# Patient Record
Sex: FEMALE | Race: WHITE | NOT HISPANIC OR LATINO | Employment: FULL TIME | URBAN - METROPOLITAN AREA
[De-identification: names, ages, dates, MRNs, and addresses within clinical notes are randomized per-mention and may not be internally consistent; named-entity substitution may affect disease eponyms.]

---

## 2023-05-05 ENCOUNTER — TRANSCRIBE ORDERS (OUTPATIENT)
Dept: LAB | Facility: HOSPITAL | Age: 28
End: 2023-05-05
Payer: COMMERCIAL

## 2023-05-05 ENCOUNTER — LAB (OUTPATIENT)
Dept: LAB | Facility: HOSPITAL | Age: 28
End: 2023-05-05
Payer: COMMERCIAL

## 2023-05-05 DIAGNOSIS — Z3A.11 11 WEEKS GESTATION OF PREGNANCY: ICD-10-CM

## 2023-05-05 DIAGNOSIS — Z34.81 PRENATAL CARE, SUBSEQUENT PREGNANCY, FIRST TRIMESTER: ICD-10-CM

## 2023-05-05 DIAGNOSIS — Z3A.11 11 WEEKS GESTATION OF PREGNANCY: Primary | ICD-10-CM

## 2023-05-05 LAB
ABO GROUP BLD: NORMAL
AMORPH URATE CRY URNS QL MICRO: ABNORMAL /HPF
AMPHET+METHAMPHET UR QL: NEGATIVE
AMPHETAMINES UR QL: NEGATIVE
BACTERIA UR QL AUTO: ABNORMAL /HPF
BARBITURATES UR QL SCN: NEGATIVE
BENZODIAZ UR QL SCN: NEGATIVE
BILIRUB UR QL STRIP: NEGATIVE
BUPRENORPHINE SERPL-MCNC: NEGATIVE NG/ML
CANNABINOIDS SERPL QL: NEGATIVE
CLARITY UR: ABNORMAL
COCAINE UR QL: NEGATIVE
COD CRY URNS QL: ABNORMAL /HPF
COLOR UR: YELLOW
DEPRECATED RDW RBC AUTO: 41.2 FL (ref 37–54)
ERYTHROCYTE [DISTWIDTH] IN BLOOD BY AUTOMATED COUNT: 12 % (ref 12.3–15.4)
GLUCOSE UR STRIP-MCNC: NEGATIVE MG/DL
HBV SURFACE AG SERPL QL IA: NORMAL
HCT VFR BLD AUTO: 36.8 % (ref 34–46.6)
HCV AB SER DONR QL: NORMAL
HGB BLD-MCNC: 12.8 G/DL (ref 12–15.9)
HGB UR QL STRIP.AUTO: NEGATIVE
HIV1+2 AB SER QL: NORMAL
HYALINE CASTS UR QL AUTO: ABNORMAL /LPF
KETONES UR QL STRIP: NEGATIVE
LEUKOCYTE ESTERASE UR QL STRIP.AUTO: NEGATIVE
MCH RBC QN AUTO: 32.7 PG (ref 26.6–33)
MCHC RBC AUTO-ENTMCNC: 34.8 G/DL (ref 31.5–35.7)
MCV RBC AUTO: 93.9 FL (ref 79–97)
METHADONE UR QL SCN: NEGATIVE
NITRITE UR QL STRIP: NEGATIVE
OPIATES UR QL: NEGATIVE
OXYCODONE UR QL SCN: NEGATIVE
PCP UR QL SCN: NEGATIVE
PH UR STRIP.AUTO: 6.5 [PH] (ref 5–8)
PLATELET # BLD AUTO: 192 10*3/MM3 (ref 140–450)
PMV BLD AUTO: 11.3 FL (ref 6–12)
PROPOXYPH UR QL: NEGATIVE
PROT UR QL STRIP: NEGATIVE
RBC # BLD AUTO: 3.92 10*6/MM3 (ref 3.77–5.28)
RBC # UR STRIP: ABNORMAL /HPF
REF LAB TEST METHOD: ABNORMAL
RH BLD: POSITIVE
SP GR UR STRIP: 1.02 (ref 1–1.03)
SQUAMOUS #/AREA URNS HPF: ABNORMAL /HPF
TRICYCLICS UR QL SCN: NEGATIVE
UROBILINOGEN UR QL STRIP: ABNORMAL
WBC # UR STRIP: ABNORMAL /HPF
WBC NRBC COR # BLD: 5.64 10*3/MM3 (ref 3.4–10.8)

## 2023-05-05 PROCEDURE — 36415 COLL VENOUS BLD VENIPUNCTURE: CPT

## 2023-05-05 PROCEDURE — 87591 N.GONORRHOEAE DNA AMP PROB: CPT | Performed by: OBSTETRICS & GYNECOLOGY

## 2023-05-05 PROCEDURE — 87086 URINE CULTURE/COLONY COUNT: CPT

## 2023-05-05 PROCEDURE — 86803 HEPATITIS C AB TEST: CPT

## 2023-05-05 PROCEDURE — 86762 RUBELLA ANTIBODY: CPT

## 2023-05-05 PROCEDURE — 80306 DRUG TEST PRSMV INSTRMNT: CPT

## 2023-05-05 PROCEDURE — 87491 CHLMYD TRACH DNA AMP PROBE: CPT | Performed by: OBSTETRICS & GYNECOLOGY

## 2023-05-05 PROCEDURE — 87340 HEPATITIS B SURFACE AG IA: CPT

## 2023-05-05 PROCEDURE — 86787 VARICELLA-ZOSTER ANTIBODY: CPT

## 2023-05-05 PROCEDURE — 86900 BLOOD TYPING SEROLOGIC ABO: CPT

## 2023-05-05 PROCEDURE — 85027 COMPLETE CBC AUTOMATED: CPT

## 2023-05-05 PROCEDURE — G0432 EIA HIV-1/HIV-2 SCREEN: HCPCS

## 2023-05-05 PROCEDURE — 86901 BLOOD TYPING SEROLOGIC RH(D): CPT

## 2023-05-05 PROCEDURE — 81001 URINALYSIS AUTO W/SCOPE: CPT

## 2023-05-05 PROCEDURE — 86780 TREPONEMA PALLIDUM: CPT

## 2023-05-06 LAB
BACTERIA SPEC AEROBE CULT: NORMAL
RUBV IGG SERPL IA-ACNC: 3.19 INDEX
VZV IGG SER IA-ACNC: <135 INDEX

## 2023-05-08 LAB
C TRACH RRNA SPEC QL NAA+PROBE: NEGATIVE
N GONORRHOEA RRNA SPEC QL NAA+PROBE: NEGATIVE
TREPONEMA PALLIDUM IGG+IGM AB [PRESENCE] IN SERUM OR PLASMA BY IMMUNOASSAY: NON REACTIVE

## 2023-09-08 ENCOUNTER — LAB (OUTPATIENT)
Dept: LAB | Facility: HOSPITAL | Age: 28
End: 2023-09-08
Payer: COMMERCIAL

## 2023-09-08 ENCOUNTER — TRANSCRIBE ORDERS (OUTPATIENT)
Dept: LAB | Facility: HOSPITAL | Age: 28
End: 2023-09-08
Payer: COMMERCIAL

## 2023-09-08 ENCOUNTER — TRANSCRIBE ORDERS (OUTPATIENT)
Dept: OBSTETRICS AND GYNECOLOGY | Facility: HOSPITAL | Age: 28
End: 2023-09-08
Payer: COMMERCIAL

## 2023-09-08 DIAGNOSIS — Z34.82 PRENATAL CARE, SUBSEQUENT PREGNANCY, SECOND TRIMESTER: ICD-10-CM

## 2023-09-08 DIAGNOSIS — O36.5931 IUGR (INTRAUTERINE GROWTH RESTRICTION) AFFECTING CARE OF MOTHER, THIRD TRIMESTER, FETUS 1: Primary | ICD-10-CM

## 2023-09-08 DIAGNOSIS — Z34.82 PRENATAL CARE, SUBSEQUENT PREGNANCY, SECOND TRIMESTER: Primary | ICD-10-CM

## 2023-09-08 DIAGNOSIS — Z3A.24 24 WEEKS GESTATION OF PREGNANCY: ICD-10-CM

## 2023-09-08 LAB
DEPRECATED RDW RBC AUTO: 42.7 FL (ref 37–54)
ERYTHROCYTE [DISTWIDTH] IN BLOOD BY AUTOMATED COUNT: 12 % (ref 12.3–15.4)
GLUCOSE 1H P 100 G GLC PO SERPL-MCNC: 101 MG/DL (ref 65–139)
HCT VFR BLD AUTO: 33.9 % (ref 34–46.6)
HGB BLD-MCNC: 11.6 G/DL (ref 12–15.9)
MCH RBC QN AUTO: 33.1 PG (ref 26.6–33)
MCHC RBC AUTO-ENTMCNC: 34.2 G/DL (ref 31.5–35.7)
MCV RBC AUTO: 96.9 FL (ref 79–97)
PLATELET # BLD AUTO: 151 10*3/MM3 (ref 140–450)
PMV BLD AUTO: 11.2 FL (ref 6–12)
RBC # BLD AUTO: 3.5 10*6/MM3 (ref 3.77–5.28)
WBC NRBC COR # BLD: 8.32 10*3/MM3 (ref 3.4–10.8)

## 2023-09-08 PROCEDURE — 36415 COLL VENOUS BLD VENIPUNCTURE: CPT

## 2023-09-08 PROCEDURE — 82962 GLUCOSE BLOOD TEST: CPT | Performed by: OBSTETRICS & GYNECOLOGY

## 2023-09-08 PROCEDURE — 85027 COMPLETE CBC AUTOMATED: CPT

## 2023-09-08 PROCEDURE — 82950 GLUCOSE TEST: CPT

## 2023-09-25 ENCOUNTER — OFFICE VISIT (OUTPATIENT)
Dept: OBSTETRICS AND GYNECOLOGY | Facility: HOSPITAL | Age: 28
End: 2023-09-25

## 2023-09-25 ENCOUNTER — HOSPITAL ENCOUNTER (OUTPATIENT)
Dept: WOMENS IMAGING | Facility: HOSPITAL | Age: 28
Discharge: HOME | End: 2023-09-25
Admitting: OBSTETRICS & GYNECOLOGY
Payer: COMMERCIAL

## 2023-09-25 VITALS — WEIGHT: 156.4 LBS | BODY MASS INDEX: 27.71 KG/M2 | DIASTOLIC BLOOD PRESSURE: 71 MMHG | SYSTOLIC BLOOD PRESSURE: 123 MMHG

## 2023-09-25 DIAGNOSIS — O36.5930 IUGR (INTRAUTERINE GROWTH RETARDATION) AFFECTING MOTHER, THIRD TRIMESTER, NOT APPLICABLE OR UNSPECIFIED FETUS: Primary | ICD-10-CM

## 2023-09-25 DIAGNOSIS — O36.5931 IUGR (INTRAUTERINE GROWTH RESTRICTION) AFFECTING CARE OF MOTHER, THIRD TRIMESTER, FETUS 1: ICD-10-CM

## 2023-09-25 PROCEDURE — 76811 OB US DETAILED SNGL FETUS: CPT

## 2023-09-25 PROCEDURE — 76819 FETAL BIOPHYS PROFIL W/O NST: CPT

## 2023-09-25 PROCEDURE — 76820 UMBILICAL ARTERY ECHO: CPT

## 2023-09-25 RX ORDER — PNV NO.95/FERROUS FUM/FOLIC AC 28MG-0.8MG
1 TABLET ORAL DAILY
COMMUNITY

## 2023-09-25 RX ORDER — ASPIRIN 81 MG/1
81 TABLET, CHEWABLE ORAL DAILY
COMMUNITY

## 2023-09-25 NOTE — LETTER
2023       No Recipients    Patient: Ninfa Garcia   YOB: 1995   Date of Visit: 2023       Dear Allison Canavan, MD,    Thank you for referring Ninfa Garcia to me for evaluation. Below is a copy of my consult note.    If you have questions, please do not hesitate to call me. I look forward to following Ninfa along with you.         Sincerely,        Ayan Sosa MD        CC:   No Recipients    No complaints today, Next f/u with [facility] on 23, NIPT negative  Pt reports covid + at 19 weeks now on baby asa daily         Maternal/Fetal Medicine Consult Note   Date: 2023  Name: Ninfa Garcia    : 1995     MRN: [id number]     Referring Provider: Allison Canavan, MD    Chief Complaint  IUGR    Subjective     History of Present Illness:  Ninfa Garcia is a 28 y.o.  31w1d who presents today for concern for fetal growth restriction and COVID diagnosis during pregnancy.    Patient states she feels well today.  Denies contractions, leaking of fluid, vaginal bleeding.  Having normal fetal movement    Patient was diagnosed with COVID around 19 weeks gestation and was prescribed aspirin after this.    SILVERIO: Estimated Date of Delivery: 23     ROS:   Otherwise Noted in HPI    History reviewed. No pertinent past medical history.   Past Surgical History:   Procedure Laterality Date   • CHOLECYSTECTOMY     • DENTAL PROCEDURE     • TONSILLECTOMY        OB History          1    Para   0    Term   0       0    AB   0    Living   0         SAB   0    IAB   0    Ectopic   0    Molar   0    Multiple   0    Live Births   0          Obstetric Comments   Fob #1 - Pregnancy #1                Current Outpatient Medications:   •  aspirin 81 MG chewable tablet, Chew 1 tablet Daily., Disp: , Rfl:   •  Prenatal Vit-Fe Fumarate-FA (Prenatal Vitamin) 27-0.8 MG tablet, Take 1 tablet by mouth Daily., Disp: , Rfl:     Objective     Vital Signs  BP  "123/71   Wt 70.9 kg (156 lb 6.4 oz)   LMP 2023   Estimated body mass index is 27.71 kg/m² as calculated from the following:    Height as of 21: 160 cm (63\").    Weight as of this encounter: 70.9 kg (156 lb 6.4 oz).    Ultrasound Impression:   See Viewpoint     Assessment and Plan     Ninfa Garcia is a 28 y.o.  31w1d who presents today for concern for fetal growth restriction and COVID diagnosis during pregnancy.    Diagnoses and all orders for this visit:    1. IUGR (intrauterine growth retardation) affecting mother, third trimester, not applicable or unspecified fetus (Primary)  Assessment & Plan:  Overall fetal weight today at the 21st percentile though abdominal circumference at the 3rd percentile giving diagnosis of IUGR. Reassuringly the amniotic fluid and umbilical artery cord Doppler are both normal today. BPP is 8/8 today.     There are various etiologies for growth restriction including: Infection, genetic factors such as aneuploidy, placental abnormalities, constitutional, and maternal vascular disease. No sonographic markers for infection were seen today. No congenital anomalies were seen today however anatomic survey was limited by advanced gestational age and fetal position. We discussed how management of growth restriction is essentially the same regardless of etiology with close fetal monitoring. Recommend weekly BPP/UA dopplers alternating between your office and at [facility] and starting NSTs. Will repeat growth ultrasound in 2 weeks. We discussed delivery between 37-38 weeks if  testing remains reassuring.     Orders:  -     [facility]; Standing         Follow Up  Follow-up growth ultrasound in 2 weeks    I spent 30 minutes caring for the patient on the day of service. This included: obtaining or reviewing a separately obtained medical history, reviewing patient records, performing a medically appropriate exam and/or evaluation, counseling or " educating the patient/family/caregiver, ordering medications, labs, and/or procedures and documenting such in the medical record. This does not include time spent on review and interpretation of other tests such as fetal ultrasound or the performance of other procedures such as amniocentesis or CVS.      Ayan Sosa MD, FACOG  Maternal Fetal Medicine, [facility]

## 2023-09-25 NOTE — PROGRESS NOTES
No complaints today, Next f/u with [facility] on 9/27/23, NIPT negative  Pt reports covid + at 19 weeks now on baby asa daily

## 2023-09-25 NOTE — ASSESSMENT & PLAN NOTE
Overall fetal weight today at the 21st percentile though abdominal circumference at the 3rd percentile giving diagnosis of IUGR. Reassuringly the amniotic fluid and umbilical artery cord Doppler are both normal today. BPP is 8/8 today.     There are various etiologies for growth restriction including: Infection, genetic factors such as aneuploidy, placental abnormalities, constitutional, and maternal vascular disease. No sonographic markers for infection were seen today. No congenital anomalies were seen today however anatomic survey was limited by advanced gestational age and fetal position. We discussed how management of growth restriction is essentially the same regardless of etiology with close fetal monitoring. Recommend weekly BPP/UA dopplers alternating between your office and at [facility] and starting NSTs. Will repeat growth ultrasound in 2 weeks. We discussed delivery between 37-38 weeks if  testing remains reassuring.

## 2023-09-30 PROCEDURE — 87086 URINE CULTURE/COLONY COUNT: CPT | Performed by: NURSE PRACTITIONER

## 2023-10-09 ENCOUNTER — OFFICE VISIT (OUTPATIENT)
Dept: OBSTETRICS AND GYNECOLOGY | Facility: HOSPITAL | Age: 28
End: 2023-10-09
Payer: COMMERCIAL

## 2023-10-09 ENCOUNTER — HOSPITAL ENCOUNTER (OUTPATIENT)
Dept: WOMENS IMAGING | Facility: HOSPITAL | Age: 28
Discharge: HOME | End: 2023-10-09
Admitting: OBSTETRICS & GYNECOLOGY
Payer: COMMERCIAL

## 2023-10-09 VITALS — WEIGHT: 158 LBS | BODY MASS INDEX: 27.99 KG/M2 | SYSTOLIC BLOOD PRESSURE: 120 MMHG | DIASTOLIC BLOOD PRESSURE: 78 MMHG

## 2023-10-09 DIAGNOSIS — O36.5930 IUGR (INTRAUTERINE GROWTH RETARDATION) AFFECTING MOTHER, THIRD TRIMESTER, NOT APPLICABLE OR UNSPECIFIED FETUS: Primary | ICD-10-CM

## 2023-10-09 PROCEDURE — 76819 FETAL BIOPHYS PROFIL W/O NST: CPT

## 2023-10-09 PROCEDURE — 76816 OB US FOLLOW-UP PER FETUS: CPT

## 2023-10-09 NOTE — PROGRESS NOTES
"Pt reports having low back pain since yesterday, \" worried about baby so wanted to get in early\", Next f/u with [facility] 10/13/23, NIPT neg   "

## 2023-10-16 ENCOUNTER — LAB (OUTPATIENT)
Dept: LAB | Facility: HOSPITAL | Age: 28
End: 2023-10-16
Payer: COMMERCIAL

## 2023-10-16 ENCOUNTER — TRANSCRIBE ORDERS (OUTPATIENT)
Dept: LAB | Facility: HOSPITAL | Age: 28
End: 2023-10-16
Payer: COMMERCIAL

## 2023-10-16 DIAGNOSIS — Z3A.34 34 WEEKS GESTATION OF PREGNANCY: ICD-10-CM

## 2023-10-16 DIAGNOSIS — O36.5990 IUGR, ANTENATAL: Primary | ICD-10-CM

## 2023-10-16 DIAGNOSIS — O36.5990 IUGR, ANTENATAL: ICD-10-CM

## 2023-10-16 DIAGNOSIS — O09.893 SUPERVISION OF OTHER HIGH RISK PREGNANCIES, THIRD TRIMESTER: ICD-10-CM

## 2023-10-16 DIAGNOSIS — L29.9 ITCHING: ICD-10-CM

## 2023-10-16 LAB
ALBUMIN SERPL-MCNC: 3.9 G/DL (ref 3.5–5.2)
ALBUMIN/GLOB SERPL: 1.4 G/DL
ALP SERPL-CCNC: 71 U/L (ref 39–117)
ALT SERPL W P-5'-P-CCNC: 10 U/L (ref 1–33)
ANION GAP SERPL CALCULATED.3IONS-SCNC: 10 MMOL/L (ref 5–15)
AST SERPL-CCNC: 14 U/L (ref 1–32)
BILE AC SERPL-SCNC: 13 UMOL/L (ref 0–10)
BILIRUB SERPL-MCNC: 1.1 MG/DL (ref 0–1.2)
BUN SERPL-MCNC: 6 MG/DL (ref 6–20)
BUN/CREAT SERPL: 10.5 (ref 7–25)
CALCIUM SPEC-SCNC: 9.5 MG/DL (ref 8.6–10.5)
CHLORIDE SERPL-SCNC: 104 MMOL/L (ref 98–107)
CO2 SERPL-SCNC: 24 MMOL/L (ref 22–29)
CREAT SERPL-MCNC: 0.57 MG/DL (ref 0.57–1)
EGFRCR SERPLBLD CKD-EPI 2021: 127.9 ML/MIN/1.73
GLOBULIN UR ELPH-MCNC: 2.8 GM/DL
GLUCOSE SERPL-MCNC: 114 MG/DL (ref 65–99)
POTASSIUM SERPL-SCNC: 4.4 MMOL/L (ref 3.5–5.2)
PROT SERPL-MCNC: 6.7 G/DL (ref 6–8.5)
SODIUM SERPL-SCNC: 138 MMOL/L (ref 136–145)

## 2023-10-16 PROCEDURE — 82239 BILE ACIDS TOTAL: CPT

## 2023-10-16 PROCEDURE — 36415 COLL VENOUS BLD VENIPUNCTURE: CPT

## 2023-10-16 PROCEDURE — 80053 COMPREHEN METABOLIC PANEL: CPT

## 2023-10-26 ENCOUNTER — TRANSCRIBE ORDERS (OUTPATIENT)
Dept: LAB | Facility: HOSPITAL | Age: 28
End: 2023-10-26
Payer: COMMERCIAL

## 2023-10-26 ENCOUNTER — LAB (OUTPATIENT)
Dept: LAB | Facility: HOSPITAL | Age: 28
End: 2023-10-26
Payer: COMMERCIAL

## 2023-10-26 DIAGNOSIS — O36.5990 POOR FETAL GROWTH, AFFECTING MANAGEMENT OF MOTHER, DELIVERED: ICD-10-CM

## 2023-10-26 DIAGNOSIS — K83.1 OBSTRUCTION OF BILE DUCT: ICD-10-CM

## 2023-10-26 DIAGNOSIS — O09.893 HISTORY OF MATERNAL HEMATOMA OF BROAD LIGAMENT, CURRENTLY PREGNANT, THIRD TRIMESTER: ICD-10-CM

## 2023-10-26 DIAGNOSIS — O36.5990 POOR FETAL GROWTH, AFFECTING MANAGEMENT OF MOTHER, DELIVERED: Primary | ICD-10-CM

## 2023-10-26 LAB — BILE AC SERPL-SCNC: 17 UMOL/L (ref 0–10)

## 2023-10-26 PROCEDURE — 82239 BILE ACIDS TOTAL: CPT

## 2023-10-26 PROCEDURE — 36415 COLL VENOUS BLD VENIPUNCTURE: CPT

## 2023-10-30 ENCOUNTER — OFFICE VISIT (OUTPATIENT)
Dept: OBSTETRICS AND GYNECOLOGY | Facility: HOSPITAL | Age: 28
End: 2023-10-30
Payer: COMMERCIAL

## 2023-10-30 ENCOUNTER — HOSPITAL ENCOUNTER (OUTPATIENT)
Dept: WOMENS IMAGING | Facility: HOSPITAL | Age: 28
Discharge: HOME | End: 2023-10-30
Admitting: OBSTETRICS & GYNECOLOGY
Payer: COMMERCIAL

## 2023-10-30 VITALS — DIASTOLIC BLOOD PRESSURE: 77 MMHG | WEIGHT: 160.8 LBS | BODY MASS INDEX: 28.48 KG/M2 | SYSTOLIC BLOOD PRESSURE: 114 MMHG

## 2023-10-30 DIAGNOSIS — K83.1 CHOLESTASIS OF PREGNANCY IN THIRD TRIMESTER: ICD-10-CM

## 2023-10-30 DIAGNOSIS — O26.613 CHOLESTASIS OF PREGNANCY IN THIRD TRIMESTER: ICD-10-CM

## 2023-10-30 DIAGNOSIS — O36.5930 IUGR (INTRAUTERINE GROWTH RETARDATION) AFFECTING MOTHER, THIRD TRIMESTER, NOT APPLICABLE OR UNSPECIFIED FETUS: Primary | ICD-10-CM

## 2023-10-30 PROCEDURE — 76816 OB US FOLLOW-UP PER FETUS: CPT

## 2023-10-30 PROCEDURE — 76819 FETAL BIOPHYS PROFIL W/O NST: CPT

## 2023-10-30 PROCEDURE — 76820 UMBILICAL ARTERY ECHO: CPT

## 2023-10-30 RX ORDER — URSODIOL 300 MG/1
600 CAPSULE ORAL 3 TIMES DAILY
Status: ON HOLD | COMMUNITY

## 2023-10-30 NOTE — LETTER
"2023       No Recipients    Patient: Ninfa Garcia   YOB: 1995   Date of Visit: 10/30/2023       Dear Marely Martinez MD,    Thank you for referring Ninfa Garcia to me for evaluation. Below is a copy of my consult note.    If you have questions, please do not hesitate to call me. I look forward to following Ninfa along with you.         Sincerely,        Smitha Bailey MD        CC:   No Recipients    Pt reports recent diagnosis of Cholestasis, taking 600mg of ursodiol tid, Next f/u with Michelle today, NIPT neg  Pt reports Induction on ,  but possibly moving up due to cholestasis        Maternal/Fetal Medicine Follow Up Note     Name: Ninfa Garcia    : 1995     MRN: [id number]     Referring Provider: Clint     Chief Complaint  Small AC, New diagnosis of cholestasis    Subjective     History of Present Illness:  Ninfa Garcia is a 28 y.o.  36w1d who presents today for follow up in the setting of known IUGR and new diagnosis of intrahepatic cholestasis of pregnancy. Currently taking Ursodiol with well controlled symptoms. Last bile acid 17   Undergoing twice weekly  testing     SILVERIO: Estimated Date of Delivery: 23     ROS:   As noted in HPI.     Objective     Vital Signs  /77   Wt 72.9 kg (160 lb 12.8 oz)   LMP 2023   Estimated body mass index is 28.48 kg/m² as calculated from the following:    Height as of 23: 160 cm (63\").    Weight as of this encounter: 72.9 kg (160 lb 12.8 oz).    Ultrasound Impression:   See viewpoint    Assessment and Plan     Ninfa Garcia is a 28 y.o.  36w1d    Diagnoses and all orders for this visit:    1. IUGR (intrauterine growth retardation) affecting mother, third trimester, not applicable or unspecified fetus (Primary)  Assessment & Plan:  While overall appropriate interval growth, the AC remains small (4th%ile today).   Considering the multiple co-morbidities of " IUGR and cholestasis of pregnancy, agree with plan for delivery at 37 weeks GA   Continued twice weekly  testing in your office until that time is recommended   Further scans with MFM at your discretion       2. Cholestasis of pregnancy in third trimester         Follow Up  No follow-ups on file.    I spent 30 minutes caring for the patient on the day of service. This included: obtaining or reviewing a separately obtained medical history, reviewing patient records, performing a medically appropriate exam and/or evaluation, counseling or educating the patient/family/caregiver, ordering medications, labs, and/or procedures and documenting such in the medical record. This does not include time spent on review and interpretation of other tests such as fetal ultrasound or the performance of other procedures such as amniocentesis or CVS.    Smitha Bailey MD FACOG  Maternal Fetal Medicine, [facility]     10/30/2023

## 2023-10-30 NOTE — ASSESSMENT & PLAN NOTE
While overall appropriate interval growth, the AC remains small (4th%ile today).   Considering the multiple co-morbidities of IUGR and cholestasis of pregnancy, agree with plan for delivery at 37 weeks GA   Continued twice weekly  testing in your office until that time is recommended   Further scans with MFM at your discretion

## 2023-10-30 NOTE — PROGRESS NOTES
"    Maternal/Fetal Medicine Follow Up Note     Name: Ninfa Garcia    : 1995     MRN: [id number]     Referring Provider: Clint     Chief Complaint  Small AC, New diagnosis of cholestasis    Subjective     History of Present Illness:  Ninfa Garcia is a 28 y.o.  36w1d who presents today for follow up in the setting of known IUGR and new diagnosis of intrahepatic cholestasis of pregnancy. Currently taking Ursodiol with well controlled symptoms. Last bile acid 17   Undergoing twice weekly  testing     SILVERIO: Estimated Date of Delivery: 23     ROS:   As noted in HPI.     Objective     Vital Signs  /77   Wt 72.9 kg (160 lb 12.8 oz)   LMP 2023   Estimated body mass index is 28.48 kg/m² as calculated from the following:    Height as of 23: 160 cm (63\").    Weight as of this encounter: 72.9 kg (160 lb 12.8 oz).    Ultrasound Impression:   See viewpoint    Assessment and Plan     Ninfa Garcia is a 28 y.o.  36w1d    Diagnoses and all orders for this visit:    1. IUGR (intrauterine growth retardation) affecting mother, third trimester, not applicable or unspecified fetus (Primary)  Assessment & Plan:  While overall appropriate interval growth, the AC remains small (4th%ile today).   Considering the multiple co-morbidities of IUGR and cholestasis of pregnancy, agree with plan for delivery at 37 weeks GA   Continued twice weekly  testing in your office until that time is recommended   Further scans with MFM at your discretion       2. Cholestasis of pregnancy in third trimester         Follow Up  No follow-ups on file.    I spent 30 minutes caring for the patient on the day of service. This included: obtaining or reviewing a separately obtained medical history, reviewing patient records, performing a medically appropriate exam and/or evaluation, counseling or educating the patient/family/caregiver, ordering medications, labs, and/or procedures " and documenting such in the medical record. This does not include time spent on review and interpretation of other tests such as fetal ultrasound or the performance of other procedures such as amniocentesis or CVS.    Smitha Bailey MD FACOG  Maternal Fetal Medicine, [facility]     10/30/2023

## 2023-10-30 NOTE — PROGRESS NOTES
Pt reports recent diagnosis of Cholestasis, taking 600mg of ursodiol tid, Next f/u with [facility] today, NIPT neg  Pt reports Induction on 11/12,  but possibly moving up due to cholestasis

## 2023-11-05 ENCOUNTER — HOSPITAL ENCOUNTER (INPATIENT)
Facility: HOSPITAL | Age: 28
LOS: 4 days | Discharge: HOME | End: 2023-11-09
Attending: OBSTETRICS & GYNECOLOGY | Admitting: OBSTETRICS & GYNECOLOGY
Payer: COMMERCIAL

## 2023-11-05 ENCOUNTER — HOSPITAL ENCOUNTER (OUTPATIENT)
Dept: LABOR AND DELIVERY | Facility: HOSPITAL | Age: 28
Discharge: HOME | End: 2023-11-05
Payer: COMMERCIAL

## 2023-11-05 DIAGNOSIS — Z98.891 STATUS POST PRIMARY LOW TRANSVERSE CESAREAN SECTION: Primary | ICD-10-CM

## 2023-11-05 PROBLEM — O36.5990 IUGR (INTRAUTERINE GROWTH RESTRICTION) AFFECTING CARE OF MOTHER: Status: ACTIVE | Noted: 2023-11-05

## 2023-11-05 LAB
ABO GROUP BLD: NORMAL
ALP SERPL-CCNC: 79 U/L (ref 39–117)
ALT SERPL W P-5'-P-CCNC: 8 U/L (ref 1–33)
AST SERPL-CCNC: 12 U/L (ref 1–32)
BILIRUB SERPL-MCNC: 1.1 MG/DL (ref 0–1.2)
BLD GP AB SCN SERPL QL: NEGATIVE
CREAT SERPL-MCNC: 0.47 MG/DL (ref 0.57–1)
DEPRECATED RDW RBC AUTO: 46.5 FL (ref 37–54)
ERYTHROCYTE [DISTWIDTH] IN BLOOD BY AUTOMATED COUNT: 13.2 % (ref 12.3–15.4)
HCT VFR BLD AUTO: 32.6 % (ref 34–46.6)
HGB BLD-MCNC: 11.4 G/DL (ref 12–15.9)
LDH SERPL-CCNC: 232 U/L (ref 135–214)
MCH RBC QN AUTO: 33.7 PG (ref 26.6–33)
MCHC RBC AUTO-ENTMCNC: 35 G/DL (ref 31.5–35.7)
MCV RBC AUTO: 96.4 FL (ref 79–97)
PLATELET # BLD AUTO: 145 10*3/MM3 (ref 140–450)
PMV BLD AUTO: 11.1 FL (ref 6–12)
RBC # BLD AUTO: 3.38 10*6/MM3 (ref 3.77–5.28)
RH BLD: POSITIVE
T&S EXPIRATION DATE: NORMAL
URATE SERPL-MCNC: 3.5 MG/DL (ref 2.4–5.7)
WBC NRBC COR # BLD: 9.24 10*3/MM3 (ref 3.4–10.8)

## 2023-11-05 PROCEDURE — 86901 BLOOD TYPING SEROLOGIC RH(D): CPT | Performed by: OBSTETRICS & GYNECOLOGY

## 2023-11-05 PROCEDURE — 83615 LACTATE (LD) (LDH) ENZYME: CPT | Performed by: OBSTETRICS & GYNECOLOGY

## 2023-11-05 PROCEDURE — 86850 RBC ANTIBODY SCREEN: CPT | Performed by: OBSTETRICS & GYNECOLOGY

## 2023-11-05 PROCEDURE — 85027 COMPLETE CBC AUTOMATED: CPT | Performed by: OBSTETRICS & GYNECOLOGY

## 2023-11-05 PROCEDURE — 84075 ASSAY ALKALINE PHOSPHATASE: CPT | Performed by: OBSTETRICS & GYNECOLOGY

## 2023-11-05 PROCEDURE — 82565 ASSAY OF CREATININE: CPT | Performed by: OBSTETRICS & GYNECOLOGY

## 2023-11-05 PROCEDURE — 25810000003 LACTATED RINGERS PER 1000 ML: Performed by: OBSTETRICS & GYNECOLOGY

## 2023-11-05 PROCEDURE — 82247 BILIRUBIN TOTAL: CPT | Performed by: OBSTETRICS & GYNECOLOGY

## 2023-11-05 PROCEDURE — 84550 ASSAY OF BLOOD/URIC ACID: CPT | Performed by: OBSTETRICS & GYNECOLOGY

## 2023-11-05 PROCEDURE — 59025 FETAL NON-STRESS TEST: CPT

## 2023-11-05 PROCEDURE — 84460 ALANINE AMINO (ALT) (SGPT): CPT | Performed by: OBSTETRICS & GYNECOLOGY

## 2023-11-05 PROCEDURE — 84450 TRANSFERASE (AST) (SGOT): CPT | Performed by: OBSTETRICS & GYNECOLOGY

## 2023-11-05 PROCEDURE — 86900 BLOOD TYPING SEROLOGIC ABO: CPT | Performed by: OBSTETRICS & GYNECOLOGY

## 2023-11-05 RX ORDER — ACETAMINOPHEN 325 MG/1
650 TABLET ORAL EVERY 4 HOURS PRN
Status: DISCONTINUED | OUTPATIENT
Start: 2023-11-05 | End: 2023-11-06 | Stop reason: SDUPTHER

## 2023-11-05 RX ORDER — PROMETHAZINE HYDROCHLORIDE 12.5 MG/1
12.5 TABLET ORAL EVERY 6 HOURS PRN
Status: DISCONTINUED | OUTPATIENT
Start: 2023-11-05 | End: 2023-11-06 | Stop reason: HOSPADM

## 2023-11-05 RX ORDER — SODIUM CHLORIDE 9 MG/ML
40 INJECTION, SOLUTION INTRAVENOUS AS NEEDED
Status: DISCONTINUED | OUTPATIENT
Start: 2023-11-05 | End: 2023-11-06

## 2023-11-05 RX ORDER — SODIUM CHLORIDE 0.9 % (FLUSH) 0.9 %
10 SYRINGE (ML) INJECTION EVERY 12 HOURS SCHEDULED
Status: DISCONTINUED | OUTPATIENT
Start: 2023-11-05 | End: 2023-11-06

## 2023-11-05 RX ORDER — MORPHINE SULFATE 2 MG/ML
1 INJECTION, SOLUTION INTRAMUSCULAR; INTRAVENOUS EVERY 4 HOURS PRN
Status: DISCONTINUED | OUTPATIENT
Start: 2023-11-05 | End: 2023-11-06

## 2023-11-05 RX ORDER — ONDANSETRON 2 MG/ML
4 INJECTION INTRAMUSCULAR; INTRAVENOUS EVERY 6 HOURS PRN
Status: DISCONTINUED | OUTPATIENT
Start: 2023-11-05 | End: 2023-11-06 | Stop reason: SDUPTHER

## 2023-11-05 RX ORDER — NALOXONE HCL 0.4 MG/ML
0.4 VIAL (ML) INJECTION
Status: DISCONTINUED | OUTPATIENT
Start: 2023-11-05 | End: 2023-11-06

## 2023-11-05 RX ORDER — OXYTOCIN/0.9 % SODIUM CHLORIDE 30/500 ML
2-20 PLASTIC BAG, INJECTION (ML) INTRAVENOUS
Status: DISCONTINUED | OUTPATIENT
Start: 2023-11-05 | End: 2023-11-06

## 2023-11-05 RX ORDER — SODIUM CHLORIDE 0.9 % (FLUSH) 0.9 %
10 SYRINGE (ML) INJECTION AS NEEDED
Status: DISCONTINUED | OUTPATIENT
Start: 2023-11-05 | End: 2023-11-06

## 2023-11-05 RX ORDER — TERBUTALINE SULFATE 1 MG/ML
0.25 INJECTION, SOLUTION SUBCUTANEOUS AS NEEDED
Status: DISCONTINUED | OUTPATIENT
Start: 2023-11-05 | End: 2023-11-06

## 2023-11-05 RX ORDER — MAGNESIUM CARB/ALUMINUM HYDROX 105-160MG
30 TABLET,CHEWABLE ORAL ONCE
Status: DISCONTINUED | OUTPATIENT
Start: 2023-11-05 | End: 2023-11-06

## 2023-11-05 RX ORDER — ONDANSETRON 4 MG/1
4 TABLET, FILM COATED ORAL EVERY 6 HOURS PRN
Status: DISCONTINUED | OUTPATIENT
Start: 2023-11-05 | End: 2023-11-06 | Stop reason: SDUPTHER

## 2023-11-05 RX ORDER — SODIUM CHLORIDE, SODIUM LACTATE, POTASSIUM CHLORIDE, CALCIUM CHLORIDE 600; 310; 30; 20 MG/100ML; MG/100ML; MG/100ML; MG/100ML
125 INJECTION, SOLUTION INTRAVENOUS CONTINUOUS
Status: DISCONTINUED | OUTPATIENT
Start: 2023-11-05 | End: 2023-11-09 | Stop reason: HOSPADM

## 2023-11-05 RX ORDER — PROMETHAZINE HYDROCHLORIDE 12.5 MG/1
12.5 SUPPOSITORY RECTAL EVERY 6 HOURS PRN
Status: DISCONTINUED | OUTPATIENT
Start: 2023-11-05 | End: 2023-11-06 | Stop reason: HOSPADM

## 2023-11-05 RX ORDER — LIDOCAINE HYDROCHLORIDE 10 MG/ML
0.5 INJECTION, SOLUTION EPIDURAL; INFILTRATION; INTRACAUDAL; PERINEURAL ONCE AS NEEDED
Status: DISCONTINUED | OUTPATIENT
Start: 2023-11-05 | End: 2023-11-06

## 2023-11-05 RX ADMIN — SODIUM CHLORIDE, POTASSIUM CHLORIDE, SODIUM LACTATE AND CALCIUM CHLORIDE 125 ML/HR: 600; 310; 30; 20 INJECTION, SOLUTION INTRAVENOUS at 21:49

## 2023-11-05 RX ADMIN — Medication 2 MILLI-UNITS/MIN: at 23:09

## 2023-11-06 ENCOUNTER — ANESTHESIA (OUTPATIENT)
Dept: LABOR AND DELIVERY | Facility: HOSPITAL | Age: 28
End: 2023-11-06
Payer: COMMERCIAL

## 2023-11-06 ENCOUNTER — ANESTHESIA EVENT (OUTPATIENT)
Dept: LABOR AND DELIVERY | Facility: HOSPITAL | Age: 28
End: 2023-11-06
Payer: COMMERCIAL

## 2023-11-06 LAB
HCT VFR BLD AUTO: 28.8 % (ref 34–46.6)
HGB BLD-MCNC: 10.3 G/DL (ref 12–15.9)

## 2023-11-06 PROCEDURE — 25010000002 BUPIVACAINE IN DEXTROSE 0.75-8.25 % SOLUTION: Performed by: NURSE ANESTHETIST, CERTIFIED REGISTERED

## 2023-11-06 PROCEDURE — 85018 HEMOGLOBIN: CPT | Performed by: OBSTETRICS & GYNECOLOGY

## 2023-11-06 PROCEDURE — 25010000002 METHYLERGONOVINE MALEATE PER 0.2 MG: Performed by: OBSTETRICS & GYNECOLOGY

## 2023-11-06 PROCEDURE — 25810000003 LACTATED RINGERS PER 1000 ML: Performed by: OBSTETRICS & GYNECOLOGY

## 2023-11-06 PROCEDURE — 25010000002 ONDANSETRON PER 1 MG: Performed by: NURSE ANESTHETIST, CERTIFIED REGISTERED

## 2023-11-06 PROCEDURE — 25010000002 FENTANYL CITRATE (PF) 50 MCG/ML SOLUTION: Performed by: OBSTETRICS & GYNECOLOGY

## 2023-11-06 PROCEDURE — 85014 HEMATOCRIT: CPT | Performed by: OBSTETRICS & GYNECOLOGY

## 2023-11-06 PROCEDURE — 25010000002 HYDROMORPHONE PER 4 MG: Performed by: NURSE ANESTHETIST, CERTIFIED REGISTERED

## 2023-11-06 PROCEDURE — 25010000002 PROMETHAZINE PER 50 MG: Performed by: NURSE ANESTHETIST, CERTIFIED REGISTERED

## 2023-11-06 PROCEDURE — 25010000002 KETOROLAC TROMETHAMINE PER 15 MG: Performed by: OBSTETRICS & GYNECOLOGY

## 2023-11-06 PROCEDURE — 25010000002 CEFAZOLIN PER 500 MG: Performed by: OBSTETRICS & GYNECOLOGY

## 2023-11-06 PROCEDURE — 25010000002 ONDANSETRON PER 1 MG: Performed by: OBSTETRICS & GYNECOLOGY

## 2023-11-06 PROCEDURE — 25010000002 FENTANYL CITRATE (PF) 50 MCG/ML SOLUTION: Performed by: NURSE ANESTHETIST, CERTIFIED REGISTERED

## 2023-11-06 PROCEDURE — 25810000003 LACTATED RINGERS SOLUTION: Performed by: OBSTETRICS & GYNECOLOGY

## 2023-11-06 PROCEDURE — 25010000002 MIDAZOLAM PER 1 MG: Performed by: NURSE ANESTHETIST, CERTIFIED REGISTERED

## 2023-11-06 PROCEDURE — 25010000002 MORPHINE PER 10 MG: Performed by: NURSE ANESTHETIST, CERTIFIED REGISTERED

## 2023-11-06 RX ORDER — ONDANSETRON 4 MG/1
4 TABLET, FILM COATED ORAL EVERY 6 HOURS PRN
Status: DISCONTINUED | OUTPATIENT
Start: 2023-11-06 | End: 2023-11-09 | Stop reason: HOSPADM

## 2023-11-06 RX ORDER — OXYCODONE HYDROCHLORIDE 5 MG/1
5 TABLET ORAL EVERY 4 HOURS PRN
Status: DISCONTINUED | OUTPATIENT
Start: 2023-11-06 | End: 2023-11-09 | Stop reason: HOSPADM

## 2023-11-06 RX ORDER — PROMETHAZINE HYDROCHLORIDE 12.5 MG/1
12.5 SUPPOSITORY RECTAL EVERY 6 HOURS PRN
Status: DISCONTINUED | OUTPATIENT
Start: 2023-11-06 | End: 2023-11-09 | Stop reason: HOSPADM

## 2023-11-06 RX ORDER — SIMETHICONE 80 MG
80 TABLET,CHEWABLE ORAL 4 TIMES DAILY PRN
Status: DISCONTINUED | OUTPATIENT
Start: 2023-11-06 | End: 2023-11-09 | Stop reason: HOSPADM

## 2023-11-06 RX ORDER — OXYTOCIN/0.9 % SODIUM CHLORIDE 30/500 ML
125 PLASTIC BAG, INJECTION (ML) INTRAVENOUS CONTINUOUS PRN
Status: COMPLETED | OUTPATIENT
Start: 2023-11-06 | End: 2023-11-06

## 2023-11-06 RX ORDER — ONDANSETRON 2 MG/ML
4 INJECTION INTRAMUSCULAR; INTRAVENOUS EVERY 6 HOURS PRN
Status: DISCONTINUED | OUTPATIENT
Start: 2023-11-06 | End: 2023-11-06 | Stop reason: HOSPADM

## 2023-11-06 RX ORDER — KETOROLAC TROMETHAMINE 30 MG/ML
30 INJECTION, SOLUTION INTRAMUSCULAR; INTRAVENOUS EVERY 6 HOURS PRN
Status: DISCONTINUED | OUTPATIENT
Start: 2023-11-06 | End: 2023-11-09 | Stop reason: HOSPADM

## 2023-11-06 RX ORDER — OXYTOCIN/0.9 % SODIUM CHLORIDE 30/500 ML
PLASTIC BAG, INJECTION (ML) INTRAVENOUS CONTINUOUS PRN
Status: DISCONTINUED | OUTPATIENT
Start: 2023-11-06 | End: 2023-11-06 | Stop reason: SURG

## 2023-11-06 RX ORDER — HYDROCORTISONE 25 MG/G
1 CREAM TOPICAL AS NEEDED
Status: DISCONTINUED | OUTPATIENT
Start: 2023-11-06 | End: 2023-11-09 | Stop reason: HOSPADM

## 2023-11-06 RX ORDER — OXYCODONE HYDROCHLORIDE 10 MG/1
10 TABLET ORAL EVERY 4 HOURS PRN
Status: DISCONTINUED | OUTPATIENT
Start: 2023-11-06 | End: 2023-11-09 | Stop reason: HOSPADM

## 2023-11-06 RX ORDER — OXYTOCIN/0.9 % SODIUM CHLORIDE 30/500 ML
30 PLASTIC BAG, INJECTION (ML) INTRAVENOUS ONCE
Status: DISCONTINUED | OUTPATIENT
Start: 2023-11-06 | End: 2023-11-09 | Stop reason: HOSPADM

## 2023-11-06 RX ORDER — METHYLERGONOVINE MALEATE 0.2 MG/ML
200 INJECTION INTRAVENOUS ONCE AS NEEDED
Status: COMPLETED | OUTPATIENT
Start: 2023-11-06 | End: 2023-11-06

## 2023-11-06 RX ORDER — DOCUSATE SODIUM 100 MG/1
100 CAPSULE, LIQUID FILLED ORAL 2 TIMES DAILY PRN
Status: DISCONTINUED | OUTPATIENT
Start: 2023-11-06 | End: 2023-11-09 | Stop reason: HOSPADM

## 2023-11-06 RX ORDER — FENTANYL CITRATE 50 UG/ML
50 INJECTION, SOLUTION INTRAMUSCULAR; INTRAVENOUS
Status: DISCONTINUED | OUTPATIENT
Start: 2023-11-06 | End: 2023-11-06

## 2023-11-06 RX ORDER — HYDROMORPHONE HYDROCHLORIDE 1 MG/ML
0.5 INJECTION, SOLUTION INTRAMUSCULAR; INTRAVENOUS; SUBCUTANEOUS
Status: DISCONTINUED | OUTPATIENT
Start: 2023-11-06 | End: 2023-11-09 | Stop reason: HOSPADM

## 2023-11-06 RX ORDER — ACETAMINOPHEN 325 MG/1
650 TABLET ORAL EVERY 6 HOURS
Status: DISCONTINUED | OUTPATIENT
Start: 2023-11-07 | End: 2023-11-09 | Stop reason: HOSPADM

## 2023-11-06 RX ORDER — HYDROXYZINE HYDROCHLORIDE 25 MG/1
50 TABLET, FILM COATED ORAL EVERY 6 HOURS PRN
Status: DISCONTINUED | OUTPATIENT
Start: 2023-11-06 | End: 2023-11-09 | Stop reason: HOSPADM

## 2023-11-06 RX ORDER — FENTANYL CITRATE 50 UG/ML
INJECTION, SOLUTION INTRAMUSCULAR; INTRAVENOUS AS NEEDED
Status: DISCONTINUED | OUTPATIENT
Start: 2023-11-06 | End: 2023-11-06 | Stop reason: SURG

## 2023-11-06 RX ORDER — EPHEDRINE SULFATE 5 MG/ML
INJECTION INTRAVENOUS AS NEEDED
Status: DISCONTINUED | OUTPATIENT
Start: 2023-11-06 | End: 2023-11-06 | Stop reason: SURG

## 2023-11-06 RX ORDER — CARBOPROST TROMETHAMINE 250 UG/ML
250 INJECTION, SOLUTION INTRAMUSCULAR AS NEEDED
Status: DISCONTINUED | OUTPATIENT
Start: 2023-11-06 | End: 2023-11-06 | Stop reason: HOSPADM

## 2023-11-06 RX ORDER — MISOPROSTOL 200 UG/1
800 TABLET ORAL AS NEEDED
Status: DISCONTINUED | OUTPATIENT
Start: 2023-11-06 | End: 2023-11-09 | Stop reason: HOSPADM

## 2023-11-06 RX ORDER — CARBOPROST TROMETHAMINE 250 UG/ML
250 INJECTION, SOLUTION INTRAMUSCULAR AS NEEDED
Status: DISCONTINUED | OUTPATIENT
Start: 2023-11-06 | End: 2023-11-09 | Stop reason: HOSPADM

## 2023-11-06 RX ORDER — OXYTOCIN/0.9 % SODIUM CHLORIDE 30/500 ML
650 PLASTIC BAG, INJECTION (ML) INTRAVENOUS ONCE
Status: CANCELLED | OUTPATIENT
Start: 2023-11-06 | End: 2023-11-06

## 2023-11-06 RX ORDER — DIPHENHYDRAMINE HYDROCHLORIDE 50 MG/ML
25 INJECTION INTRAMUSCULAR; INTRAVENOUS EVERY 4 HOURS PRN
Status: DISCONTINUED | OUTPATIENT
Start: 2023-11-06 | End: 2023-11-09 | Stop reason: HOSPADM

## 2023-11-06 RX ORDER — PHENYLEPHRINE HCL IN 0.9% NACL 1 MG/10 ML
SYRINGE (ML) INTRAVENOUS AS NEEDED
Status: DISCONTINUED | OUTPATIENT
Start: 2023-11-06 | End: 2023-11-06 | Stop reason: SURG

## 2023-11-06 RX ORDER — NALOXONE HCL 0.4 MG/ML
0.4 VIAL (ML) INJECTION
Status: ACTIVE | OUTPATIENT
Start: 2023-11-06 | End: 2023-11-07

## 2023-11-06 RX ORDER — ACETAMINOPHEN 500 MG
1000 TABLET ORAL EVERY 6 HOURS
Status: COMPLETED | OUTPATIENT
Start: 2023-11-06 | End: 2023-11-07

## 2023-11-06 RX ORDER — ONDANSETRON 2 MG/ML
4 INJECTION INTRAMUSCULAR; INTRAVENOUS ONCE AS NEEDED
Status: CANCELLED | OUTPATIENT
Start: 2023-11-06

## 2023-11-06 RX ORDER — CALCIUM CARBONATE 500 MG/1
1 TABLET, CHEWABLE ORAL EVERY 4 HOURS PRN
Status: DISCONTINUED | OUTPATIENT
Start: 2023-11-06 | End: 2023-11-09 | Stop reason: HOSPADM

## 2023-11-06 RX ORDER — ONDANSETRON 4 MG/1
4 TABLET, FILM COATED ORAL EVERY 6 HOURS PRN
Status: DISCONTINUED | OUTPATIENT
Start: 2023-11-06 | End: 2023-11-06 | Stop reason: HOSPADM

## 2023-11-06 RX ORDER — OXYTOCIN/0.9 % SODIUM CHLORIDE 30/500 ML
30 PLASTIC BAG, INJECTION (ML) INTRAVENOUS ONCE
Status: COMPLETED | OUTPATIENT
Start: 2023-11-06 | End: 2023-11-06

## 2023-11-06 RX ORDER — PROMETHAZINE HYDROCHLORIDE 25 MG/1
25 TABLET ORAL EVERY 6 HOURS PRN
Status: DISCONTINUED | OUTPATIENT
Start: 2023-11-06 | End: 2023-11-09 | Stop reason: HOSPADM

## 2023-11-06 RX ORDER — BUPIVACAINE HYDROCHLORIDE 7.5 MG/ML
INJECTION, SOLUTION INTRASPINAL AS NEEDED
Status: DISCONTINUED | OUTPATIENT
Start: 2023-11-06 | End: 2023-11-06 | Stop reason: SURG

## 2023-11-06 RX ORDER — FENTANYL CITRATE 50 UG/ML
50 INJECTION, SOLUTION INTRAMUSCULAR; INTRAVENOUS
Status: CANCELLED | OUTPATIENT
Start: 2023-11-06

## 2023-11-06 RX ORDER — ONDANSETRON 2 MG/ML
4 INJECTION INTRAMUSCULAR; INTRAVENOUS EVERY 6 HOURS PRN
Status: DISCONTINUED | OUTPATIENT
Start: 2023-11-06 | End: 2023-11-09 | Stop reason: HOSPADM

## 2023-11-06 RX ORDER — SODIUM CHLORIDE 9 MG/ML
40 INJECTION, SOLUTION INTRAVENOUS AS NEEDED
Status: DISCONTINUED | OUTPATIENT
Start: 2023-11-06 | End: 2023-11-09 | Stop reason: HOSPADM

## 2023-11-06 RX ORDER — ONDANSETRON 2 MG/ML
INJECTION INTRAMUSCULAR; INTRAVENOUS AS NEEDED
Status: DISCONTINUED | OUTPATIENT
Start: 2023-11-06 | End: 2023-11-06 | Stop reason: SURG

## 2023-11-06 RX ORDER — SODIUM CHLORIDE 0.9 % (FLUSH) 0.9 %
3 SYRINGE (ML) INJECTION EVERY 12 HOURS SCHEDULED
Status: DISCONTINUED | OUTPATIENT
Start: 2023-11-06 | End: 2023-11-09 | Stop reason: HOSPADM

## 2023-11-06 RX ORDER — MISOPROSTOL 200 UG/1
800 TABLET ORAL AS NEEDED
Status: DISCONTINUED | OUTPATIENT
Start: 2023-11-06 | End: 2023-11-06 | Stop reason: HOSPADM

## 2023-11-06 RX ORDER — METHYLERGONOVINE MALEATE 0.2 MG/1
200 TABLET ORAL EVERY 6 HOURS SCHEDULED
Status: COMPLETED | OUTPATIENT
Start: 2023-11-06 | End: 2023-11-07

## 2023-11-06 RX ORDER — SODIUM CHLORIDE 0.9 % (FLUSH) 0.9 %
3-10 SYRINGE (ML) INJECTION AS NEEDED
Status: DISCONTINUED | OUTPATIENT
Start: 2023-11-06 | End: 2023-11-09 | Stop reason: HOSPADM

## 2023-11-06 RX ORDER — PROMETHAZINE HYDROCHLORIDE 12.5 MG/1
12.5 TABLET ORAL EVERY 4 HOURS PRN
Status: DISCONTINUED | OUTPATIENT
Start: 2023-11-06 | End: 2023-11-09 | Stop reason: HOSPADM

## 2023-11-06 RX ORDER — MORPHINE SULFATE 0.5 MG/ML
INJECTION, SOLUTION EPIDURAL; INTRATHECAL; INTRAVENOUS AS NEEDED
Status: DISCONTINUED | OUTPATIENT
Start: 2023-11-06 | End: 2023-11-06 | Stop reason: SURG

## 2023-11-06 RX ORDER — FAMOTIDINE 10 MG/ML
INJECTION, SOLUTION INTRAVENOUS AS NEEDED
Status: DISCONTINUED | OUTPATIENT
Start: 2023-11-06 | End: 2023-11-06 | Stop reason: SURG

## 2023-11-06 RX ORDER — DIPHENHYDRAMINE HCL 25 MG
25 CAPSULE ORAL EVERY 4 HOURS PRN
Status: DISCONTINUED | OUTPATIENT
Start: 2023-11-06 | End: 2023-11-09 | Stop reason: HOSPADM

## 2023-11-06 RX ORDER — PRENATAL VIT/IRON FUM/FOLIC AC 27MG-0.8MG
1 TABLET ORAL DAILY
Status: DISCONTINUED | OUTPATIENT
Start: 2023-11-06 | End: 2023-11-09 | Stop reason: HOSPADM

## 2023-11-06 RX ORDER — KETOROLAC TROMETHAMINE 15 MG/ML
15 INJECTION, SOLUTION INTRAMUSCULAR; INTRAVENOUS EVERY 6 HOURS
Status: COMPLETED | OUTPATIENT
Start: 2023-11-06 | End: 2023-11-07

## 2023-11-06 RX ORDER — OXYTOCIN/0.9 % SODIUM CHLORIDE 30/500 ML
30 PLASTIC BAG, INJECTION (ML) INTRAVENOUS CONTINUOUS
Status: ACTIVE | OUTPATIENT
Start: 2023-11-06 | End: 2023-11-06

## 2023-11-06 RX ORDER — CITRIC ACID/SODIUM CITRATE 334-500MG
SOLUTION, ORAL ORAL
Status: COMPLETED
Start: 2023-11-06 | End: 2023-11-06

## 2023-11-06 RX ORDER — EPHEDRINE SULFATE 5 MG/ML
INJECTION INTRAVENOUS
Status: DISCONTINUED
Start: 2023-11-06 | End: 2023-11-09 | Stop reason: HOSPADM

## 2023-11-06 RX ORDER — ACETAMINOPHEN 500 MG
1000 TABLET ORAL EVERY 6 HOURS PRN
Status: DISCONTINUED | OUTPATIENT
Start: 2023-11-06 | End: 2023-11-09 | Stop reason: HOSPADM

## 2023-11-06 RX ORDER — METHYLERGONOVINE MALEATE 0.2 MG/ML
200 INJECTION INTRAVENOUS AS NEEDED
Status: DISCONTINUED | OUTPATIENT
Start: 2023-11-06 | End: 2023-11-09 | Stop reason: HOSPADM

## 2023-11-06 RX ORDER — MIDAZOLAM HYDROCHLORIDE 1 MG/ML
INJECTION INTRAMUSCULAR; INTRAVENOUS AS NEEDED
Status: DISCONTINUED | OUTPATIENT
Start: 2023-11-06 | End: 2023-11-06 | Stop reason: SURG

## 2023-11-06 RX ORDER — IBUPROFEN 600 MG/1
600 TABLET ORAL EVERY 6 HOURS
Status: DISCONTINUED | OUTPATIENT
Start: 2023-11-07 | End: 2023-11-09 | Stop reason: HOSPADM

## 2023-11-06 RX ORDER — ALUMINA, MAGNESIA, AND SIMETHICONE 2400; 2400; 240 MG/30ML; MG/30ML; MG/30ML
15 SUSPENSION ORAL EVERY 4 HOURS PRN
Status: DISCONTINUED | OUTPATIENT
Start: 2023-11-06 | End: 2023-11-09 | Stop reason: HOSPADM

## 2023-11-06 RX ORDER — OXYTOCIN/0.9 % SODIUM CHLORIDE 30/500 ML
85 PLASTIC BAG, INJECTION (ML) INTRAVENOUS ONCE
Status: CANCELLED | OUTPATIENT
Start: 2023-11-06 | End: 2023-11-06

## 2023-11-06 RX ADMIN — SODIUM CHLORIDE 2000 MG: 900 INJECTION INTRAVENOUS at 09:03

## 2023-11-06 RX ADMIN — KETOROLAC TROMETHAMINE 15 MG: 15 INJECTION, SOLUTION INTRAMUSCULAR; INTRAVENOUS at 17:26

## 2023-11-06 RX ADMIN — FENTANYL CITRATE 50 MCG: 50 INJECTION, SOLUTION INTRAMUSCULAR; INTRAVENOUS at 06:59

## 2023-11-06 RX ADMIN — MIDAZOLAM 1 MG: 1 INJECTION INTRAMUSCULAR; INTRAVENOUS at 09:16

## 2023-11-06 RX ADMIN — SODIUM CHLORIDE, POTASSIUM CHLORIDE, SODIUM LACTATE AND CALCIUM CHLORIDE: 600; 310; 30; 20 INJECTION, SOLUTION INTRAVENOUS at 09:36

## 2023-11-06 RX ADMIN — SODIUM CHLORIDE, POTASSIUM CHLORIDE, SODIUM LACTATE AND CALCIUM CHLORIDE 125 ML/HR: 600; 310; 30; 20 INJECTION, SOLUTION INTRAVENOUS at 02:40

## 2023-11-06 RX ADMIN — SODIUM CHLORIDE, POTASSIUM CHLORIDE, SODIUM LACTATE AND CALCIUM CHLORIDE 1000 ML: 600; 310; 30; 20 INJECTION, SOLUTION INTRAVENOUS at 09:03

## 2023-11-06 RX ADMIN — FAMOTIDINE 20 MG: 10 INJECTION INTRAVENOUS at 09:26

## 2023-11-06 RX ADMIN — OXYCODONE HYDROCHLORIDE 10 MG: 10 TABLET ORAL at 13:07

## 2023-11-06 RX ADMIN — SIMETHICONE 80 MG: 80 TABLET, CHEWABLE ORAL at 20:03

## 2023-11-06 RX ADMIN — ACETAMINOPHEN 1000 MG: 500 TABLET ORAL at 20:03

## 2023-11-06 RX ADMIN — KETOROLAC TROMETHAMINE 30 MG: 30 INJECTION INTRAMUSCULAR; INTRAVENOUS at 10:44

## 2023-11-06 RX ADMIN — METHYLERGONOVINE MALEATE 200 MCG: 0.2 TABLET ORAL at 15:19

## 2023-11-06 RX ADMIN — HYDROMORPHONE HYDROCHLORIDE 0.5 MG: 1 INJECTION, SOLUTION INTRAMUSCULAR; INTRAVENOUS; SUBCUTANEOUS at 12:12

## 2023-11-06 RX ADMIN — METHYLERGONOVINE MALEATE 200 MCG: 0.2 INJECTION, SOLUTION INTRAMUSCULAR; INTRAVENOUS at 12:14

## 2023-11-06 RX ADMIN — SODIUM CITRATE AND CITRIC ACID MONOHYDRATE 30 ML: 500; 334 SOLUTION ORAL at 09:04

## 2023-11-06 RX ADMIN — ONDANSETRON 4 MG: 2 INJECTION INTRAMUSCULAR; INTRAVENOUS at 16:30

## 2023-11-06 RX ADMIN — ONDANSETRON 4 MG: 2 INJECTION INTRAMUSCULAR; INTRAVENOUS at 09:26

## 2023-11-06 RX ADMIN — METHYLERGONOVINE MALEATE 200 MCG: 0.2 TABLET ORAL at 23:58

## 2023-11-06 RX ADMIN — Medication 1000 ML/HR: at 09:36

## 2023-11-06 RX ADMIN — Medication 30 UNITS: at 11:23

## 2023-11-06 RX ADMIN — BUPIVACAINE HYDROCHLORIDE IN DEXTROSE 1.4 ML: 7.5 INJECTION, SOLUTION SUBARACHNOID at 09:21

## 2023-11-06 RX ADMIN — KETOROLAC TROMETHAMINE 15 MG: 15 INJECTION, SOLUTION INTRAMUSCULAR; INTRAVENOUS at 23:58

## 2023-11-06 RX ADMIN — PROMETHAZINE HYDROCHLORIDE 12.5 MG: 25 INJECTION INTRAMUSCULAR; INTRAVENOUS at 12:45

## 2023-11-06 RX ADMIN — MORPHINE SULFATE 0.1 MG: 0.5 INJECTION, SOLUTION EPIDURAL; INTRATHECAL; INTRAVENOUS at 09:21

## 2023-11-06 RX ADMIN — Medication 100 MCG: at 09:45

## 2023-11-06 RX ADMIN — FENTANYL CITRATE 20 MCG: 50 INJECTION, SOLUTION INTRAMUSCULAR; INTRAVENOUS at 09:21

## 2023-11-06 RX ADMIN — ACETAMINOPHEN 1000 MG: 500 TABLET ORAL at 15:19

## 2023-11-06 RX ADMIN — FENTANYL CITRATE 50 MCG: 50 INJECTION, SOLUTION INTRAMUSCULAR; INTRAVENOUS at 05:34

## 2023-11-06 RX ADMIN — EPHEDRINE SULFATE 7.5 MG: 5 INJECTION INTRAVENOUS at 09:30

## 2023-11-06 RX ADMIN — ACETAMINOPHEN 1000 MG: 325 TABLET ORAL at 09:04

## 2023-11-06 RX ADMIN — Medication 125 ML/HR: at 14:36

## 2023-11-06 RX ADMIN — ONDANSETRON 4 MG: 2 INJECTION INTRAMUSCULAR; INTRAVENOUS at 11:16

## 2023-11-06 NOTE — ANESTHESIA PREPROCEDURE EVALUATION
Anesthesia Evaluation     Patient summary reviewed and Nursing notes reviewed                Airway   Dental      Pulmonary - negative pulmonary ROS   Cardiovascular - negative cardio ROS        Neuro/Psych- negative ROS  GI/Hepatic/Renal/Endo    (+) liver disease (Cholestasis of pregnancy)    Musculoskeletal (-) negative ROS    Abdominal    Substance History - negative use     OB/GYN    (+) Pregnant        Other                    Anesthesia Plan    ASA 2     spinal and ITN       Anesthetic plan, risks, benefits, and alternatives have been provided, discussed and informed consent has been obtained with: patient.    CODE STATUS:    Level Of Support Discussed With: Patient  Code Status (Patient has no pulse and is not breathing): CPR (Attempt to Resuscitate)  Medical Interventions (Patient has pulse or is breathing): Full Support

## 2023-11-06 NOTE — OP NOTE
"[facility]   Section Operative Note    Pre-Operative Dx:   1.  IUP at Gestational Age: 37w1d  weeks    2. IUGR  3. Cholestasis of Pregnancy  4. Unfavorable Cervix remote from Delivery  5. Maternal Choice for CD         Postoperative dx:    1.  Same     Procedure: Procedure(s):   SECTION PRIMARY   Surgeon/Assistant: Surgeon(s):  Marely Martinez MD         Anesthesia:  Anesthesiologist: Choice  Anesthesiologist: Marvel Clemente DO  CRNA: Eveline Calvillo CRNA         QBL:  592mL        IV Fluids: 800 mls.   UOP: 300 mls.    I/O this shift:  In: 800 [I.V.:800]  Out: 892 [Urine:300; Blood:592]   Antibiotics: cefazolin (Ancef)     Infant:           Gender: female  infant    Weight: 2851 g (6 lb 4.6 oz)     Apgars:  8 @ 1 minute /       9@ 5 minutes    Cord gases: Venous:  No results found for: \"PHCVEN\", \"BECVEN\"     Arterial:  No results found for: \"PHCART\", \"BECART\"     Indication for C/Section:             Priority for C/Section:        Procedure Details:   The patient was taken to the operating room after risks and benefits have been reviewed. The consent was reviewed and had been signed. Time Out was performed. She was prepped and draped in the normal sterile fashion in the dorsal supine position and a leftward tilt.  A Pfannenstiel skin incision made 2 cm above the pubic symphysis and carried down to the underlying layer of fascia with the scalpel.  The fascia was nicked in the midline and the incision was extended laterally with Wright scissors.  The anterior aspect of the incision was grasped with Kocher clamps x2 and elevated and the underlying rectus muscles were dissected off sharply and bluntly.  The inferior aspect of the incision was treated similarly.  The peritoneum was identified and entered bluntly the incision was extended with lateral traction.  A bladder blade was inserted.  The lower uterine segment was identified and incised in a transverse fashion with the scalpel.  The uterine " cavity was entered bluntly and the incision was extended with cephalocaudad traction.  The fetus was difficult to deliver. Left rectus muscles divided with bandage scissors to allow for more space. Still unable to deliver fetus through rectus. Forceps placed over fetal head and the fetus was delivered through the incision. Cord was clamped and cut after 60 second delay and the infant was handed off to DRT staff for evaluation.  Cord blood was obtained.  The placenta was delivered via uterine massage.  The uterus was then exteriorized and cleared of all clot and debris with clean laparotomy sponges.  The hysterotomy was closed in a single layer with a #1 chromic in a running locking fashion.  Hemostasis was noted.  The posterior cul-de-sac was irrigated and aspirated.  The hysterotomy was again inspected and noted to be hemostatic.  The uterus was returned to the abdomen.  The paracolic gutters were irrigated and aspirated.  Hysterotomy was inspected for third time and noted to be hemostatic.  The peritoneum was closed with 2-0 chromic in a running fashion.  The rectus muscle was reapproximated with #1 Chromic with figure of eight .hemostasis noted. The fascia was then closed with 1 Vicryl in a running stitch.  The subcutaneous tissues were irrigated aspirated and made hemostatic with Bovie electrocautery.  The subcutaneous tissues were reapproximated with 3-0 Vicryl and the skin was closed with 4-0 Monocryl in a subcuticular fashion and sealed with Dermabond.  All counts were correct and the patient was taken to the recovery room in stable condition.         Complications:   None     Specimens: none     Disposition:   Mother to Mother Baby/Postpartum  in stable condition currently.   Baby to NBN  in stable condition currently.       Marely Martinez MD  11/6/2023  09:52 EST

## 2023-11-06 NOTE — PROGRESS NOTES
Laborist    Asked to perform bedside ultrasound and recovery room status post primary  section due to breech vaginal bleeding.  Patient hemodynamically stable  Uterus firm 3 to 4 cm below the umbilicus  Pocket bedside ultrasound revealed  a small amount of blood noted within the endometrial stripe.  No blood clots or free flow noted on perineum .  Preop H&H 11.4 32.6.  Total EBL 1067ml  Continue Pitocin 30 units over the next 8 hours.

## 2023-11-06 NOTE — H&P
TAMMI Benitez  Obstetric History and Physical    CC: IOL    SUBJECTIVE:     Patient is a 28 y.o. female  currently at 37w1d, who presents with pregnancy c/b IUGR with AC 4%tile and Cholestasis of pregnancy for IOL, Was unable to tolerate FB placement overnight and has been on LDP until now.   Has has 2 doses of IV pain medication.      Prenatal Information:  Prenatal Results       Initial Prenatal Labs       Test Value Reference Range Date Time    Hemoglobin  12.8 g/dL 12.0 - 15.9 23 1121    Hematocrit  36.8 % 34.0 - 46.6 23 1121    Platelets  192 10*3/mm3 140 - 450 23 1121    Rubella IgG  3.19 index Immune >0.99 23 112    Hepatitis B SAg  Non-Reactive  Non-Reactive 23 112    Hepatitis C Ab  Non-Reactive  Non-Reactive 23    RPR        T. Pallidum Ab         ABO  A   23    Rh  Positive   23    Antibody Screen        HIV  Non-Reactive  Non-Reactive 23 112    Urine Culture  >100,000 CFU/mL Mixed Elena Isolated   23 0928       <25,000 CFU/mL Normal Urogenital Elena   23 1121    Gonorrhea  Negative  Negative 23 112    Chlamydia  Negative  Negative 23 112    TSH        HgB A1c         Varicella IgG  <135 index Immune >165 23 1121    HgB Electrophoresis         Cystic fibrosis                   Fetal testing        Test Value Reference Range Date Time    NIPT        MSAFP        AFP-4                  2nd and 3rd Trimester       Test Value Reference Range Date Time    Hemoglobin (repeated)  11.4 g/dL 12.0 - 15.9 23       11.6 g/dL 12.0 - 15.9 23 1027    Hematocrit (repeated)  32.6 % 34.0 - 46.6 23       33.9 % 34.0 - 46.6 23 1027    Platelets   145 10*3/mm3 140 - 450 23       151 10*3/mm3 140 - 450 23 1027       192 10*3/mm3 140 - 450 23 1121    GCT  101 mg/dL 65 - 139 23 1027    Antibody Screen (repeated)  Negative   23    GTT Fasting         GTT 1 Hr        GTT 2 Hr        GTT 3 Hr        Group B Strep                  Other testing        Test Value Reference Range Date Time    Parvo IgG         CMV IgG                   Drug Screening       Test Value Reference Range Date Time    Amphetamine Screen  Negative  Negative 05/05/23 1121    Barbiturate Screen  Negative  Negative 05/05/23 1121    Benzodiazepine Screen  Negative  Negative 05/05/23 1121    Methadone Screen  Negative  Negative 05/05/23 1121    Phencyclidine Screen  Negative  Negative 05/05/23 1121    Opiates Screen  Negative  Negative 05/05/23 1121    THC Screen  Negative  Negative 05/05/23 1121    Cocaine Screen  Negative  Negative 05/05/23 1121    Propoxyphene Screen  Negative  Negative 05/05/23 1121    Buprenorphine Screen  Negative  Negative 05/05/23 1121    Methamphetamine Screen  Negative  Negative 05/05/23 1121    Oxycodone Screen  Negative  Negative 05/05/23 1121    Tricyclic Antidepressants Screen  Negative  Negative 05/05/23 1121              Legend    ^: Historical                          External Prenatal Results       Pregnancy Outside Results - Transcribed From Office Records - See Scanned Records For Details       Test Value Date Time    ABO  A  11/05/23 2147    Rh  Positive  11/05/23 2147    Antibody Screen  Negative  11/05/23 2147    Varicella IgG  <135 index 05/05/23 1121    Rubella  3.19 index 05/05/23 1121    Hgb  11.4 g/dL 11/05/23 2147       11.6 g/dL 09/08/23 1027       12.8 g/dL 05/05/23 1121    Hct  32.6 % 11/05/23 2147       33.9 % 09/08/23 1027       36.8 % 05/05/23 1121    Glucose Fasting GTT       Glucose Tolerance Test 1 hour       Glucose Tolerance Test 3 hour       Gonorrhea (discrete)  Negative  05/05/23 1121    Chlamydia (discrete)  Negative  05/05/23 1121    RPR       VDRL       Syphilis Antibody       HBsAg  Non-Reactive  05/05/23 1121    Herpes Simplex Virus PCR       Herpes Simplex VIrus Culture       HIV  Non-Reactive  05/05/23 1121    Hep C RNA Quant PCR        Hep C Antibody  Non-Reactive  23 1121    AFP       Group B Strep       GBS Susceptibility to Clindamycin       GBS Susceptibility to Erythromycin       Fetal Fibronectin       Genetic Testing, Maternal Blood                 Drug Screening       Test Value Date Time    Urine Drug Screen       Amphetamine Screen  Negative  23 1121    Barbiturate Screen  Negative  23 1121    Benzodiazepine Screen  Negative  23 1121    Methadone Screen  Negative  23 1121    Phencyclidine Screen  Negative  23 1121    Opiates Screen  Negative  23 1121    THC Screen  Negative  23 1121    Cocaine Screen       Propoxyphene Screen  Negative  23 1121    Buprenorphine Screen  Negative  23 1121    Methamphetamine Screen       Oxycodone Screen  Negative  23 1121    Tricyclic Antidepressants Screen  Negative  23 1121              Legend    ^: Historical                             OB History:                     OB History    Para Term  AB Living   1 0 0 0 0 0   SAB IAB Ectopic Molar Multiple Live Births   0 0 0 0 0 0      # Outcome Date GA Lbr Redd/2nd Weight Sex Delivery Anes PTL Lv   1 Current               Obstetric Comments   Fob #1 - Pregnancy #1       Allergies:                        Allergies   Allergen Reactions    Azithromycin Nausea Only and GI Intolerance      Past Medical History: No past medical history on file.   Past Surgical History Past Surgical History:   Procedure Laterality Date    CHOLECYSTECTOMY      DENTAL PROCEDURE      TONSILLECTOMY        Family History: Family History   Family history unknown: Yes      Social History:  reports that she has never smoked. She has never been exposed to tobacco smoke. She has never used smokeless tobacco.   reports no history of alcohol use.   reports no history of drug use.    General ROS: Pertinent items are noted in HPI, all other systems reviewed and negative   Medications: Prenatal Vitamin,  aspirin, azelaic acid, and ursodiol       Objective       Vital Signs Range for the last 24 hours  Temperature: Temp:  [98 °F (36.7 °C)-98.7 °F (37.1 °C)] 98 °F (36.7 °C)   BP: BP: (111-124)/(64-75) 117/69   Pulse: Heart Rate:  [83-93] 87   Respirations: Resp:  [16] 16   SPO2:                 Physical Examination: General appearance - alert, well appearing, and in no distress  Chest - clear to auscultation, no wheezes, rales or rhonchi, symmetric air entry  Heart - normal rate, regular rhythm, normal S1, S2, no murmurs, rubs, clicks or gallops  Abdomen - Soft, gravid, nontender  Extremities - peripheral pulses normal, no pedal edema, no clubbing or cyanosis      Presentation:    Cervix: Exam by: MD   Dilation: Cervical Dilation (cm): 0   Effacement: Cervical Effacement: 0%   Station: Fetal Station: 1-->-2       Fetal Heart Rate Assessment           Baseline: Fetal HR Baseline: normal range   Variability: Fetal HR Variability: moderate (amplitude range 6 to 25 bpm)   Accels: Fetal HR Accelerations: absent   Decels: Fetal HR Decelerations: absent   Tracing Category:       Uterine Assessment   Method: Method: external tocotransducer   Frequency (min): Contraction Frequency (Minutes): 3-3.5   Ctx Count in 10 min:       Laboratory Results:  WBC   Date Value Ref Range Status   11/05/2023 9.24 3.40 - 10.80 10*3/mm3 Final     RBC   Date Value Ref Range Status   11/05/2023 3.38 (L) 3.77 - 5.28 10*6/mm3 Final     Hemoglobin   Date Value Ref Range Status   11/05/2023 11.4 (L) 12.0 - 15.9 g/dL Final     Hematocrit   Date Value Ref Range Status   11/05/2023 32.6 (L) 34.0 - 46.6 % Final     MCV   Date Value Ref Range Status   11/05/2023 96.4 79.0 - 97.0 fL Final     MCH   Date Value Ref Range Status   11/05/2023 33.7 (H) 26.6 - 33.0 pg Final     MCHC   Date Value Ref Range Status   11/05/2023 35.0 31.5 - 35.7 g/dL Final     RDW   Date Value Ref Range Status   11/05/2023 13.2 12.3 - 15.4 % Final     RDW-SD   Date Value Ref Range  Status   11/05/2023 46.5 37.0 - 54.0 fl Final     MPV   Date Value Ref Range Status   11/05/2023 11.1 6.0 - 12.0 fL Final     Platelets   Date Value Ref Range Status   11/05/2023 145 140 - 450 10*3/mm3 Final             Assessment/Plan:   IUP 37w1d with IUGR and IHCP    1.Very unfavorable CE. Pt unable to tolerate exams or FB placement. Discussed options for IOL with cytotec/cervadil, epidural with attempt at FB placement versus Primary CD. Pt has elected for P CD given unfavorable cervix remote from delivery.   2.GBS neg  3.Ancef/Bicitra  4. FWB reassuring      Marely Martinez MD  11/6/2023  08:39 EST

## 2023-11-06 NOTE — ANESTHESIA POSTPROCEDURE EVALUATION
Patient: Ninfa Garcia    Procedure Summary       Date: 23 Room / Location: [facility] LABOR DELIVERY   [facility]IV[facility]    Anesthesia Start: 915 Anesthesia Stop:     Procedure:  SECTION PRIMARY (Abdomen) Diagnosis:     Surgeons: Marely Martinez MD Provider: Marvel Clemente DO    Anesthesia Type: spinal, ITN ASA Status: 2            Anesthesia Type: spinal, ITN    Vitals  Vitals Value Taken Time   /78 23 1005   Temp 97.9 °F (36.6 °C) 23 0958   Pulse 85 23 1006   Resp 18    SpO2 96 % 23 1006   Vitals shown include unfiled device data.        Post Anesthesia Care and Evaluation    Patient location during evaluation: bedside  Patient participation: complete - patient participated  Level of consciousness: awake and alert  Pain score: 0  Pain management: adequate    Airway patency: patent  Anesthetic complications: No anesthetic complications    Cardiovascular status: acceptable  Respiratory status: acceptable  Hydration status: acceptable

## 2023-11-06 NOTE — PROGRESS NOTES
Ninfa Garcia  [id number]  1995      Unable to place transcervical kerr due to pt intolerance of exam.  Discussed poss placement after epidural.  Pt does not desire this.  Will call Dr. Martinez for further instructions.    Ayan Ansari MD  11/5/2023  22:45 EST

## 2023-11-07 LAB
BASOPHILS # BLD AUTO: 0.02 10*3/MM3 (ref 0–0.2)
BASOPHILS NFR BLD AUTO: 0.2 % (ref 0–1.5)
DEPRECATED RDW RBC AUTO: 49.1 FL (ref 37–54)
EOSINOPHIL # BLD AUTO: 0.1 10*3/MM3 (ref 0–0.4)
EOSINOPHIL NFR BLD AUTO: 1.1 % (ref 0.3–6.2)
ERYTHROCYTE [DISTWIDTH] IN BLOOD BY AUTOMATED COUNT: 13.3 % (ref 12.3–15.4)
HCT VFR BLD AUTO: 25.3 % (ref 34–46.6)
HGB BLD-MCNC: 8.5 G/DL (ref 12–15.9)
IMM GRANULOCYTES # BLD AUTO: 0.03 10*3/MM3 (ref 0–0.05)
IMM GRANULOCYTES NFR BLD AUTO: 0.3 % (ref 0–0.5)
LYMPHOCYTES # BLD AUTO: 1.16 10*3/MM3 (ref 0.7–3.1)
LYMPHOCYTES NFR BLD AUTO: 13 % (ref 19.6–45.3)
MCH RBC QN AUTO: 34 PG (ref 26.6–33)
MCHC RBC AUTO-ENTMCNC: 33.6 G/DL (ref 31.5–35.7)
MCV RBC AUTO: 101.2 FL (ref 79–97)
MONOCYTES # BLD AUTO: 0.53 10*3/MM3 (ref 0.1–0.9)
MONOCYTES NFR BLD AUTO: 5.9 % (ref 5–12)
NEUTROPHILS NFR BLD AUTO: 7.1 10*3/MM3 (ref 1.7–7)
NEUTROPHILS NFR BLD AUTO: 79.5 % (ref 42.7–76)
NRBC BLD AUTO-RTO: 0 /100 WBC (ref 0–0.2)
PLATELET # BLD AUTO: 99 10*3/MM3 (ref 140–450)
PMV BLD AUTO: 11.2 FL (ref 6–12)
RBC # BLD AUTO: 2.5 10*6/MM3 (ref 3.77–5.28)
WBC NRBC COR # BLD: 8.94 10*3/MM3 (ref 3.4–10.8)

## 2023-11-07 PROCEDURE — 25010000002 KETOROLAC TROMETHAMINE PER 15 MG: Performed by: OBSTETRICS & GYNECOLOGY

## 2023-11-07 PROCEDURE — 85025 COMPLETE CBC W/AUTO DIFF WBC: CPT | Performed by: OBSTETRICS & GYNECOLOGY

## 2023-11-07 PROCEDURE — 63710000001 DIPHENHYDRAMINE PER 50 MG: Performed by: NURSE ANESTHETIST, CERTIFIED REGISTERED

## 2023-11-07 RX ORDER — DIAPER,BRIEF,INFANT-TODD,DISP
1 EACH MISCELLANEOUS EVERY 12 HOURS SCHEDULED
Status: DISCONTINUED | OUTPATIENT
Start: 2023-11-07 | End: 2023-11-09 | Stop reason: HOSPADM

## 2023-11-07 RX ORDER — FERROUS SULFATE 325(65) MG
325 TABLET ORAL
Status: DISCONTINUED | OUTPATIENT
Start: 2023-11-08 | End: 2023-11-09 | Stop reason: HOSPADM

## 2023-11-07 RX ADMIN — DIPHENHYDRAMINE HYDROCHLORIDE 25 MG: 25 CAPSULE ORAL at 11:09

## 2023-11-07 RX ADMIN — METHYLERGONOVINE MALEATE 200 MCG: 0.2 TABLET ORAL at 06:04

## 2023-11-07 RX ADMIN — KETOROLAC TROMETHAMINE 15 MG: 15 INJECTION, SOLUTION INTRAMUSCULAR; INTRAVENOUS at 06:04

## 2023-11-07 RX ADMIN — ACETAMINOPHEN 1000 MG: 500 TABLET ORAL at 03:40

## 2023-11-07 RX ADMIN — ACETAMINOPHEN 650 MG: 325 TABLET ORAL at 14:34

## 2023-11-07 RX ADMIN — IBUPROFEN 600 MG: 600 TABLET, FILM COATED ORAL at 17:39

## 2023-11-07 RX ADMIN — SIMETHICONE 80 MG: 80 TABLET, CHEWABLE ORAL at 14:38

## 2023-11-07 RX ADMIN — KETOROLAC TROMETHAMINE 15 MG: 15 INJECTION, SOLUTION INTRAMUSCULAR; INTRAVENOUS at 10:56

## 2023-11-07 RX ADMIN — OXYCODONE HYDROCHLORIDE 10 MG: 10 TABLET ORAL at 22:43

## 2023-11-07 RX ADMIN — ACETAMINOPHEN 650 MG: 325 TABLET ORAL at 20:22

## 2023-11-07 RX ADMIN — OXYCODONE HYDROCHLORIDE 10 MG: 10 TABLET ORAL at 16:36

## 2023-11-07 RX ADMIN — METHYLERGONOVINE MALEATE 200 MCG: 0.2 TABLET ORAL at 10:57

## 2023-11-07 RX ADMIN — OXYCODONE HYDROCHLORIDE 10 MG: 10 TABLET ORAL at 11:09

## 2023-11-07 RX ADMIN — OXYCODONE HYDROCHLORIDE 10 MG: 10 TABLET ORAL at 06:40

## 2023-11-07 RX ADMIN — ACETAMINOPHEN 1000 MG: 500 TABLET ORAL at 08:25

## 2023-11-07 NOTE — ANESTHESIA POSTPROCEDURE EVALUATION
Patient: Ninfa Garcia    Procedure Summary       Date: 23 Room / Location: [facility] LABOR DELIVERY   [facility]IV[facility]    Anesthesia Start: 915 Anesthesia Stop:     Procedure:  SECTION PRIMARY (Abdomen) Diagnosis:     Surgeons: Marely Martinez MD Provider: Marvel Clemente DO    Anesthesia Type: spinal, ITN ASA Status: 2            Anesthesia Type: spinal, ITN    Vitals  Vitals Value Taken Time   /68 23 1202   Temp 98.1 °F (36.7 °C) 23 1202   Pulse 94 23 1202   Resp 18 23 1202   SpO2 97 % 23 1354   Vitals shown include unfiled device data.        Post Anesthesia Care and Evaluation    Patient location during evaluation: bedside  Patient participation: complete - patient participated  Level of consciousness: awake and alert  Pain management: adequate    Airway patency: patent  Anesthetic complications: No anesthetic complications    Cardiovascular status: acceptable  Respiratory status: acceptable  Hydration status: acceptable  Post Neuraxial Block status: Motor and sensory function returned to baseline and No signs or symptoms of PDPH

## 2023-11-07 NOTE — LACTATION NOTE
11/06/23 1648   Maternal Information   Date of Referral 11/06/23   Person Making Referral lactation consultant   Maternal Reason for Referral no prior breastfeeding experience  (courtesy visit for new delivery.)   Maternal Assessment   Breast Size Issue none   Breast Shape Bilateral:;round   Breast Density Bilateral:;soft   Nipples Bilateral:;short   Left Nipple Symptoms intact;nontender   Right Nipple Symptoms intact;nontender   Maternal Infant Feeding   Maternal Emotional State receptive;relaxed   Infant Positioning clutch/football  (Left)   Signs of Milk Transfer none noted  (A couple suckles noted & infant quickly went to sleep. Several minutes were spent at breast attempting to wake infant.Placed skin to skin on mother. I recommended pt begin pumping on her Spectra pump since infant has been too sleepy reluctant to nurse.)   Pain with Feeding no   Comfort Measures Before/During Feeding suction broken using finger;maternal position adjusted;latch adjusted;infant position adjusted   Latch Assistance full assistance needed  (Pt attempted to take over after demonstration given to latch infant on L FB)   Support Person Involvement actively supporting mother   Milk Expression/Equipment   Breast Pump Type double electric, personal  (Pt has her Spectra pump at bedside. Encouraged pt to watch 5 min video on how to operate pump. QR code was given. LC to follow up tomorrow per patient request.)   Breast Pumping   Breast Pumping Interventions   (Encouraged pt to start pumping q3hrs. Infant is 37.1 gestation.)

## 2023-11-07 NOTE — LACTATION NOTE
23 1026   Maternal Information   Date of Referral 23   Person Making Referral nurse;patient   Maternal Reason for Referral no prior breastfeeding experience   Infant Reason for Referral  infant   Maternal Assessment   Breast Shape Bilateral:;round   Breast Density Bilateral:;soft   Nipples Bilateral:;everted;short   Left Nipple Symptoms intact;nontender   Right Nipple Symptoms intact;nontender   Maternal Infant Feeding   Maternal Emotional State anxious;receptive   Infant Positioning clutch/football  (left)   Signs of Milk Transfer deep jaw excursions noted;audible swallow   Pain with Feeding no   Comfort Measures Before/During Feeding infant position adjusted;maternal position adjusted   Nipple Shape After Feeding, Left Breast pinched  (slightly pinched after baby had visibly slid to tip of nipple, relatched deeper)   Latch Assistance full assistance needed  (MOB very timid)   Support Person Involvement actively supporting mother     Patient asked RN for LC assistance. MOB lying in bed, requesting to nurse baby. Baby spent night in nursery getting formula. MOB reports she has not pumped yet. FOB adjusted head of bed and pillows for football hold, LC brought baby to mom on left side. Baby latched well, deep jaw excursions noted. MOB asking how to tell baby is getting enough, baby with audible swallow, pointed it out to MOB. Baby nursing well for greater than 10 minutes with stimulation from LC when LC left room. Encouraged FOB to take over with stimulation. Encouraged to pump for short or missed feedings and with supplementation. Reviewed with parents where to find spectra video on patient TV as well as QR code on their handout. Encouraged to call as needs arise.

## 2023-11-07 NOTE — PROGRESS NOTES
TAMMI Benitez   PROGRESS NOTE      Subjective     Patient reports:   POD#1 Primary C/S with postpartum hemorrhage. Reports that she is very sore but otherwise doing well. Tolerating regular diet. Has not voided yet since catheter removed this morning. Pain controlled with meds. Ambulating. VSS. Afebrile. PP h/h showed anemia due to hemorrhage. Denies CP, SOB, palpitations or near syncope.     Objective      Vitals: Vital Signs Range for the last 24 hours  Temperature: Temp:  [97.3 °F (36.3 °C)-98.5 °F (36.9 °C)] 98.5 °F (36.9 °C)   Temp Source: Temp src: Oral   BP: BP: ()/(53-92) 103/62   Pulse: Heart Rate:  [] 85   Respirations: Resp:  [16-20] 16   SPO2: SpO2:  [96 %-99 %] 97 %   O2 Amount (l/min):     O2 Devices Device (Oxygen Therapy): room air          Physical Exam    Lungs clear to auscultation bilaterally   Abdomen Soft, non-distended   Incision  healing well, no drainage, no erythema, no swelling, well approximated   Extremities Homans sign is negative, no sign of DVT     LABS:  Lab Results   Component Value Date    WBC 8.94 2023    HGB 8.5 (L) 2023    HCT 25.3 (L) 2023    .2 (H) 2023    PLT 99 (L) 2023       Assessment & Plan        IUGR (intrauterine growth restriction) affecting care of mother      Assessment:    Ninfa Garcia is Day 1  post-partum        Plan:  continue post op care. Start iron supplement.        Courtney Bhatia CNM  2023  10:05 EST

## 2023-11-08 RX ADMIN — IBUPROFEN 600 MG: 600 TABLET, FILM COATED ORAL at 18:25

## 2023-11-08 RX ADMIN — FERROUS SULFATE TAB 325 MG (65 MG ELEMENTAL FE) 325 MG: 325 (65 FE) TAB at 08:57

## 2023-11-08 RX ADMIN — CALCIUM CARBONATE (ANTACID) CHEW TAB 500 MG 1 TABLET: 500 CHEW TAB at 00:36

## 2023-11-08 RX ADMIN — IBUPROFEN 600 MG: 600 TABLET, FILM COATED ORAL at 06:21

## 2023-11-08 RX ADMIN — ACETAMINOPHEN 650 MG: 325 TABLET ORAL at 14:29

## 2023-11-08 RX ADMIN — IBUPROFEN 600 MG: 600 TABLET, FILM COATED ORAL at 23:43

## 2023-11-08 RX ADMIN — ACETAMINOPHEN 650 MG: 325 TABLET ORAL at 08:56

## 2023-11-08 RX ADMIN — OXYCODONE HYDROCHLORIDE 5 MG: 5 TABLET ORAL at 23:59

## 2023-11-08 RX ADMIN — IBUPROFEN 600 MG: 600 TABLET, FILM COATED ORAL at 00:33

## 2023-11-08 RX ADMIN — ACETAMINOPHEN 650 MG: 325 TABLET ORAL at 20:30

## 2023-11-08 RX ADMIN — ACETAMINOPHEN 650 MG: 325 TABLET ORAL at 02:46

## 2023-11-08 RX ADMIN — IBUPROFEN 600 MG: 600 TABLET, FILM COATED ORAL at 11:44

## 2023-11-08 RX ADMIN — PRENATAL VITAMINS-IRON FUMARATE 27 MG IRON-FOLIC ACID 0.8 MG TABLET 1 TABLET: at 08:56

## 2023-11-08 RX ADMIN — OXYCODONE HYDROCHLORIDE 10 MG: 10 TABLET ORAL at 04:25

## 2023-11-08 NOTE — PROGRESS NOTES
[facility]  Obstetric Progress Note    Chief Complaint: POD 2    Subjective   Feeling well. Pain controlled. Venita diet +amb. Lochia appr.     Objective     Vital Signs Range for the last 24 hours  Temp:  [97.9 °F (36.6 °C)-98.4 °F (36.9 °C)] 98.4 °F (36.9 °C)   BP: (101-129)/(58-69) 112/69   Heart Rate:  [63-94] 88   Resp:  [16-18] 16                   Intake/Output last 24 hours:      Intake/Output Summary (Last 24 hours) at 11/8/2023 0848  Last data filed at 11/7/2023 1934  Gross per 24 hour   Intake --   Output 2000 ml   Net -2000 ml       Intake/Output this shift:    No intake/output data recorded.    Physical Exam:  General: No acute distress   Heart RRR   Lungs CTAB     Abdomen Soft, appropriately tender, fundus firm, incision CDI   Extremities Exam of extremities: pedal edema tr +       Laboratory Results:    No new    Assessment/Plan: POD 2 s/p PCD  RPOC advance care  2. PPH with Anemia: rpt labs today. On iron, asx  3. Female infant  4. Dc home tomorrow        Marely Martinez MD  11/8/2023  08:48 EST

## 2023-11-08 NOTE — LACTATION NOTE
11/08/23 1220   Maternal Information   Date of Referral 11/08/23   Person Making Referral lactation consultant  (fu consult)   Maternal Reason for Referral no prior breastfeeding experience  (plans to pump and bottle feed;  has not started pumping yet)   Infant Reason for Referral   (baby getting formula till milk comes in)   Maternal Assessment   Breast Size Issue none   Breast Shape Bilateral:;round   Breast Density Bilateral:;soft   Nipples Bilateral:;graspable   Left Nipple Symptoms intact;nontender   Right Nipple Symptoms intact;nontender   Milk Expression/Equipment   Breast Pump Type double electric, personal  (Mom has a personal spectra S2 pump in the room)   Breast Pump Flange Type hard   Breast Pump Flange Size 24 mm   Breast Pumping   Breast Pumping Interventions post-feed pumping encouraged  (Encouraged to pump every 3 hours to get the best milk supply possible;  Encouraged to begin pumping as soon as possible. Mom has QR code for instructional video--to watch video before using pump.)     Discussed milk supply, pump use, breast care, milk storage, how to feed small volumes of pumped milk with a syringe. Gave mom microwave steam sterilizer bag and instructed on use and which parts to sterilize before using. To call lactation services, if there are questions or concerns.

## 2023-11-09 VITALS
HEIGHT: 63 IN | DIASTOLIC BLOOD PRESSURE: 69 MMHG | BODY MASS INDEX: 28.35 KG/M2 | OXYGEN SATURATION: 97 % | TEMPERATURE: 97.6 F | RESPIRATION RATE: 16 BRPM | WEIGHT: 160 LBS | SYSTOLIC BLOOD PRESSURE: 118 MMHG | HEART RATE: 81 BPM

## 2023-11-09 PROBLEM — Z98.891 STATUS POST PRIMARY LOW TRANSVERSE CESAREAN SECTION: Status: ACTIVE | Noted: 2023-11-09

## 2023-11-09 RX ORDER — PSEUDOEPHEDRINE HCL 30 MG
100 TABLET ORAL 2 TIMES DAILY PRN
Qty: 60 CAPSULE | Refills: 1 | Status: SHIPPED | OUTPATIENT
Start: 2023-11-09

## 2023-11-09 RX ORDER — FERROUS SULFATE 325(65) MG
325 TABLET ORAL 2 TIMES DAILY WITH MEALS
Qty: 60 TABLET | Refills: 1 | Status: SHIPPED | OUTPATIENT
Start: 2023-11-09

## 2023-11-09 RX ORDER — OXYCODONE HYDROCHLORIDE 5 MG/1
5 TABLET ORAL EVERY 4 HOURS PRN
Qty: 18 TABLET | Refills: 0 | Status: SHIPPED | OUTPATIENT
Start: 2023-11-09 | End: 2023-11-13

## 2023-11-09 RX ORDER — IBUPROFEN 600 MG/1
600 TABLET ORAL EVERY 6 HOURS PRN
Qty: 60 TABLET | Refills: 1 | Status: SHIPPED | OUTPATIENT
Start: 2023-11-09

## 2023-11-09 RX ADMIN — DOCUSATE SODIUM 100 MG: 100 CAPSULE, LIQUID FILLED ORAL at 08:13

## 2023-11-09 RX ADMIN — FERROUS SULFATE TAB 325 MG (65 MG ELEMENTAL FE) 325 MG: 325 (65 FE) TAB at 08:13

## 2023-11-09 RX ADMIN — ACETAMINOPHEN 650 MG: 325 TABLET ORAL at 02:42

## 2023-11-09 RX ADMIN — ACETAMINOPHEN 650 MG: 325 TABLET ORAL at 15:29

## 2023-11-09 RX ADMIN — PRENATAL VITAMINS-IRON FUMARATE 27 MG IRON-FOLIC ACID 0.8 MG TABLET 1 TABLET: at 08:13

## 2023-11-09 RX ADMIN — IBUPROFEN 600 MG: 600 TABLET, FILM COATED ORAL at 06:08

## 2023-11-09 RX ADMIN — IBUPROFEN 600 MG: 600 TABLET, FILM COATED ORAL at 11:54

## 2023-11-09 RX ADMIN — ACETAMINOPHEN 650 MG: 325 TABLET ORAL at 08:13

## 2023-11-09 NOTE — DISCHARGE SUMMARY
[facility]   Discharge Summary      Patient: Ninfa Garcia      MR#:[id number]  Admission  Diagnosis: IUGR (intrauterine growth restriction) affecting care of mother  Discharge Diagnosis:   1. Status post primary low transverse  section        Date of Admission: 2023  Date of Discharge:  2023    Procedures:  , Low Transverse     2023    9:35 AM      Service:  Obstetrics    Hospital Course:  Patient underwent  section and remained in the hospital for 4 days.  During that time she remained afebrile and hemodynamically stable.  On the day of discharge, she was eating, ambulating and voiding without difficulty.  She is breastfeeding and supplementing with formula as needed.       Labs:    Lab Results (last 24 hours)       ** No results found for the last 24 hours. **            Discharge Medications     Discharge Medications        New Medications        Instructions Start Date   docusate sodium 100 MG capsule   100 mg, Oral, 2 Times Daily PRN      ferrous sulfate 325 (65 FE) MG tablet   325 mg, Oral, 2 Times Daily With Meals      ibuprofen 600 MG tablet  Commonly known as: ADVIL,MOTRIN   600 mg, Oral, Every 6 Hours PRN      oxyCODONE 5 MG immediate release tablet  Commonly known as: ROXICODONE   5 mg, Oral, Every 4 Hours PRN             Continue These Medications        Instructions Start Date   Prenatal Vitamin 27-0.8 MG tablet   1 tablet, Oral, Daily             Stop These Medications      aspirin 81 MG chewable tablet     azelaic acid 15 % gel  Commonly known as: AZELEX     ursodiol 300 MG capsule  Commonly known as: ACTIGALL              Discharge Disposition:  To Home    Discharge Condition:  Stable. PP anemia taking ferrous sulfate.     Discharge Diet: regular    Activity at Discharge: pelvic rest. No driving for 2 weeks.     Follow-up Appointments  Incision check in 2 weeks.      Jatinder Abreu CNM  23  08:11 EST

## 2023-11-09 NOTE — PLAN OF CARE
Goal Outcome Evaluation:  Plan of Care Reviewed With: patient        Progress: improving  Outcome Evaluation: vs lochia and incision wnl, passing gas, pain controlled, bonding well w/baby

## 2023-11-09 NOTE — LACTATION NOTE
11/09/23 1115   Maternal Information   Date of Referral 11/09/23   Person Making Referral nurse;patient   Maternal Reason for Referral no prior breastfeeding experience;latch difficulty   Infant Reason for Referral   (baby has gotten mostly formula from bottles. Mom now wants to breastfeed.)   Maternal Assessment   Breast Size Issue none   Breast Shape Bilateral:;round   Breast Density Bilateral:;filling   Nipples Bilateral:;graspable   Left Nipple Symptoms intact;tender   Right Nipple Symptoms intact;nontender   Maternal Infant Feeding   Maternal Emotional State anxious;receptive   Infant Positioning   (tried crosscradle then switched to football on right and then football on the left)   Signs of Milk Transfer deep jaw excursions noted;audible swallow   Pain with Feeding yes  (slight biting with feeding on the left)   Pain Location nipple, left   Pain Description other (see comments)  (biting)   Comfort Measures Before/During Feeding infant position adjusted;latch adjusted;maternal position adjusted;suction broken using finger   Nipple Shape After Feeding, Left Breast other (see comments)  (mildly compressed)   Nipple Shape After Feeding, Right other (see comments)  (mildly compressed after feeding)   Latch Assistance minimal assistance   Support Person Involvement actively supporting mother   Milk Expression/Equipment   Breast Pump Type double electric, personal   Breast Pumping   Breast Pumping Interventions post-feed pumping encouraged  (Encouraged to pump after feedings)     Visited patient at 1010 and answered questions. Encouraged to pump q. 3 hours after breastfeeding. Plans to continue formula until her milk is fully in. Discussed milk supply, breast care, pump use, positioning and latch, nipple care, and f/u with peds tomorrow. Encouraged to f/u with lactation as needed.

## 2023-11-09 NOTE — PROGRESS NOTES
2023    Name:Ninfa Garcia    MR#:[id number]     PROGRESS NOTE:  Post-Op 3 S/P        Subjective   28 y.o. yo Female  s/p CS at 37w1d doing well. Pain well controlled, lochia appropriate, tolerating diet. She is breastfeeding and supplementing with formula as needed.       IUGR (intrauterine growth restriction) affecting care of mother    Status post primary low transverse  section    Postpartum anemia        Objective    Vitals  Temp:  Temp:  [97.9 °F (36.6 °C)-98.1 °F (36.7 °C)] 97.9 °F (36.6 °C)  Temp src: Oral  BP:  BP: (113-115)/(63-67) 113/67  Pulse:  Heart Rate:  [77-90] 77  RR:   Resp:  [16] 16    General Awake, alert, no distress  Abdomen Soft, non-distended, fundus firm, below umbilicus, appropriately tender  Incision  Intact, no erythema or exudate  Extremities Calves NT bilaterally     I/O last 3 completed shifts:  In: -   Out: 800 [Urine:800]    Lab Results   Component Value Date    WBC 8.94 2023    HGB 8.5 (L) 2023    HCT 25.3 (L) 2023    .2 (H) 2023    PLT 99 (L) 2023    CREATININE 0.47 (L) 2023    URICACID 3.5 2023    AST 12 2023    ALT 8 2023     (H) 2023    HEPBSAG Non-Reactive 2023     Results from last 7 days   Lab Units 237   ABO TYPING  A   RH TYPING  Positive   ANTIBODY SCREEN  Negative       Infant: female       Assessment   1.  POD 3 from  Section   2.  PP anemia 2/2 acute blood loss.     Plan: Doing well.  Continue ferrous sulfate 325mg PO BID.  Consideration for d/c as clinically indicated.   D/C instructions given, precautions reviewed.        Jatinder Abreu CNM  2023 08:12 EST

## (undated) DEVICE — MAT PREVALON MOBL TRANSFR AIR W/PAD REPROC 39X81IN

## (undated) DEVICE — GLV SURG BIOGEL LTX PF 6

## (undated) DEVICE — PK C/SECT 10

## (undated) DEVICE — COATED VICRYL  (POLYGLACTIN 910) SUTURE, VIOLET BRAIDED, STERILE, SYNTHETIC ABSORBABLE SUTURE: Brand: COATED VICRYL

## (undated) DEVICE — ADHS SKIN PREMIERPRO EXOFIN TOPICAL HI/VISC .5ML

## (undated) DEVICE — CLTH CLENS READYCLEANSE PERI CARE PK/5

## (undated) DEVICE — SUT MONOCRYL SZ 4 0 18IN PS1 Y682H BX/36

## (undated) DEVICE — GLV SURG BIOGEL LTX PF 6 1/2

## (undated) DEVICE — SUT GUT CHRM 2/0 CT1 27IN 811H

## (undated) DEVICE — SUT VIC 3/0 CT1 27IN DYED J338H

## (undated) DEVICE — PATIENT RETURN ELECTRODE, SINGLE-USE, CONTACT QUALITY MONITORING, ADULT, WITH 9FT CORD, FOR PATIENTS WEIGING OVER 33LBS. (15KG): Brand: MEGADYNE

## (undated) DEVICE — TRAP FLD MINIVAC MEGADYNE 100ML

## (undated) DEVICE — SOL IRR H2O BTL 1000ML STRL

## (undated) DEVICE — 4-PORT MANIFOLD: Brand: NEPTUNE 2

## (undated) DEVICE — SUT GUT CHRM 1 CTX 36IN 905H

## (undated) DEVICE — SOL IRR NACL 0.9PCT BT 1000ML

## (undated) DEVICE — APPL CHLORAPREP TINTED 26ML TEAL

## (undated) DEVICE — TRY SPINE BLCK WHITACRE 25G 3X5IN

## (undated) DEVICE — TBG PENCL TELESCP MEGADYNE SMOKE EVAC 10FT